# Patient Record
Sex: FEMALE | Race: WHITE | Employment: OTHER | URBAN - METROPOLITAN AREA
[De-identification: names, ages, dates, MRNs, and addresses within clinical notes are randomized per-mention and may not be internally consistent; named-entity substitution may affect disease eponyms.]

---

## 2017-01-25 ENCOUNTER — TELEPHONE (OUTPATIENT)
Dept: SURGERY | Age: 77
End: 2017-01-25

## 2017-01-25 NOTE — TELEPHONE ENCOUNTER
Spoke to pt, had left breast excisional biopsy 12/5/16, patient reports that she feels lump at surgery site, warm to touch, no sure if it is red, but does have occasional pain. Patient requesting an appointment. No available appts within next week, please advise.

## 2017-01-27 ENCOUNTER — OFFICE VISIT (OUTPATIENT)
Dept: SURGERY | Age: 77
End: 2017-01-27

## 2017-01-27 VITALS
HEIGHT: 61 IN | WEIGHT: 149.5 LBS | BODY MASS INDEX: 28.22 KG/M2 | RESPIRATION RATE: 20 BRPM | OXYGEN SATURATION: 96 % | SYSTOLIC BLOOD PRESSURE: 140 MMHG | HEART RATE: 62 BPM | DIASTOLIC BLOOD PRESSURE: 69 MMHG

## 2017-01-27 DIAGNOSIS — Z09 POSTOPERATIVE EXAMINATION: Primary | ICD-10-CM

## 2017-01-27 NOTE — MR AVS SNAPSHOT
Visit Information Date & Time Provider Department Dept. Phone Encounter #  
 1/27/2017  8:00 AM Lurdes Resendiz MD [facility] [id number] Your Appointments 2/3/2017 11:40 AM  
ESTABLISHED PATIENT with Lurdes Resendiz MD  
[facility] Dr. Skyler Kaur ([facility]) Appt Note: left Br [facility]peraation [address] [address]  
[address], [address] [facility], [address] [place] [address] [address] [address]  
  
    
 4/26/2017  1:20 PM  
ESTABLISHED PATIENT with Lurdes Resendiz MD  
[facility] Dr[facility] Skyler Kaur ([facility]) Appt Note: 1 yr breast check; ofc notes received given to [address], [address] [facility], [address] [facility]  
[phone] Upcoming Health Maintenance Date Due DTaP/Tdap/Td series (1 - Tdap) 5/24/1961 ZOSTER VACCINE AGE 60> 5/24/2000 GLAUCOMA SCREENING Q2Y 5/24/2005 OSTEOPOROSIS SCREENING (DEXA) 5/24/2005 Pneumococcal 65+ Low/Medium Risk (1 of 2 - PCV13) 5/24/2005 MEDICARE YEARLY EXAM 5/24/2005 INFLUENZA AGE 9 TO ADULT 8/1/2016 BREAST CANCER SCRN MAMMOGRAM 9/22/2017 Allergies as of 1/27/2017  Review Complete On: 1/27/2017 By: Lurdes Resendiz MD  
  
 Severity Noted Reaction Type Reactions Donnaphen [Phenobarb-hyoscy-atropine-scop] Medium 04/11/2011   Systemic Rash Arimidex [Anastrozole]  04/16/2012   Systemic Other (comments) Wandering pulmonary infiltrates Levaquin [Levofloxacin]  11/22/2016    Other (comments) \"set me wild\" Erythromycin Low 04/11/2011   Intolerance Other (comments) Abdominal pain Current Immunizations  Never Reviewed No immunizations on file. Not reviewed this visit You Were Diagnosed With   
  
 Codes Comments Postoperative examination    -  Primary ICD-10-CM: L16 ICD-9-CM: V67.00 Vitals BP Pulse Resp Height(growth percentile) Weight(growth percentile) SpO2  
 140/69 (BP 1 Location: Right arm, BP Patient Position: Sitting) 62 20 5' 1\" (1.549 m) 149 lb 8 oz (67.8 kg) 96% BMI OB Status Smoking Status 28.25 kg/m2 Hysterectomy Former Smoker BMI and BSA Data Body Mass Index Body Surface Area  
 28.25 kg/m 2 1.71 m 2 Preferred Pharmacy Pharmacy Name Phone [id number]- [place], [place] [phone] Your Updated Medication List  
  
   
This list is accurate as of: 1/27/17  8:57 AM.  Always use your most recent med list.  
  
  
  
  
 ARMOUR THYROID PO Take 15 mcg by mouth. atenolol 25 mg tablet Commonly known as:  TENORMIN Take 25 mg by mouth two (2) times a day. CALCIUM 600 PO Take  by mouth. CIPRO 500 mg tablet Generic drug:  ciprofloxacin HCl Take 500 mg by mouth two (2) times a day. clonazePAM 0.5 mg tablet Commonly known as:  [place] Take  by mouth nightly as needed. DIOVAN 80 mg tablet Generic drug:  valsartan Take 80 mg by mouth two (2) times a day. DULERA 200-5 mcg/actuation HFA inhaler Generic drug:  mometasone-formoterol Take 2 Puffs by inhalation two (2) times daily as needed. fluticasone 50 mcg/actuation nasal spray Commonly known as:  FLONASE  
nightly as needed. hydroCHLOROthiazide 25 mg tablet Commonly known as:  HYDRODIURIL Take 25 mg by mouth daily. PROAIR HFA 90 mcg/actuation inhaler Generic drug:  albuterol Take 2 Puffs by inhalation every four (4) hours as needed for Wheezing. PROTONIX 40 mg tablet Generic drug:  pantoprazole Take 40 mg by mouth daily. VITAMIN D3 1,000 unit tablet Generic drug:  cholecalciferol Take 4,000 Units by mouth daily. Introducing \[facility]\"" & HEALTH SERVICES!    
 Janette Rivera introduces [employer] patient portal. Now you can access parts of your medical record, email your doctor's office, and request medication refills online. 1. In your internet browser, go to [email or web address] 2. Click on the First Time User? Click Here link in the Sign In box. You will see the New Member Sign Up page. 3. Enter your ugichem Access Code exactly as it appears below. You will not need to use this code after youve completed the sign-up process. If you do not sign up before the expiration date, you must request a new code. · [employer]hem Access Code: [email or web address] Expires: 2/7/2017  4:16 PM 
 
4. Enter the last four digits of your Social Security Number (xxxx) and Date of Birth (mm/dd/yyyy) as indicated and click Submit. You will be taken to the next sign-up page. 5. Create a ugichem ID. This will be your ugichem login ID and cannot be changed, so think of one that is secure and easy to remember. 6. Create a ugichem password. You can change your password at any time. 7. Enter your Password Reset Question and Answer. This can be used at a later time if you forget your password. 8. Enter your e-mail address. You will receive e-mail notification when new information is available in [address] 9. Click Sign Up. You can now view and download portions of your medical record. 10. Click the Download Summary menu link to download a portable copy of your medical information. If you have questions, please visit the Frequently Asked Questions section of the ugichem website. Remember, [employer]ichem is NOT to be used for urgent needs. For medical emergencies, dial 911. Now available from your iPhone and Android! Please provide this summary of care documentation to your next provider. Your primary care clinician is listed as Dulcie Bloch. If you have any questions after today's visit, please call [phone].

## 2017-01-27 NOTE — PROGRESS NOTES
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 Negative for carcinoma.      S I feel ok more pain, no drainage but I have increasing swelling    Visit Vitals    /69 (BP 1 Location: Right arm, BP Patient Position: Sitting)    Pulse 62    Resp 20    Ht 5' 1\" (1.549 m)    Wt 67.8 kg (149 lb 8 oz)    SpO2 96%    BMI 28.25 kg/m2       O Incisions healing well without infection   Moderate seroma    A/P Doing well   FNA seroma 150 ml serosanguinous   RTC 1 week    Thea Wen MD FACS

## 2017-02-03 ENCOUNTER — OFFICE VISIT (OUTPATIENT)
Dept: SURGERY | Age: 77
End: 2017-02-03

## 2017-02-03 VITALS
DIASTOLIC BLOOD PRESSURE: 78 MMHG | HEART RATE: 65 BPM | TEMPERATURE: 97 F | BODY MASS INDEX: 28.04 KG/M2 | WEIGHT: 148.5 LBS | OXYGEN SATURATION: 98 % | SYSTOLIC BLOOD PRESSURE: 180 MMHG | HEIGHT: 61 IN | RESPIRATION RATE: 20 BRPM

## 2017-02-03 DIAGNOSIS — Z09 POSTOPERATIVE EXAMINATION: Primary | ICD-10-CM

## 2017-02-03 NOTE — MR AVS SNAPSHOT
Visit Information Date & Time Provider Department Dept. Phone Encounter #  
 2/3/2017 11:40 AM Keegan Hendrickson MD [facility] [place] [id number] [id number] Your Appointments 4/26/2017  1:20 PM  
ESTABLISHED PATIENT with Keegan Hendrickson MD  
[facility] Dr. Skyler Kaur ([facility]) Appt Note: 1 yr breast check; ofc notes received given to [address], [address], [address] [address]  
[address], [address], [address] [facility] [address] [address] Upcoming Health Maintenance Date Due DTaP/Tdap/Td series (1 - Tdap) 5/24/1961 ZOSTER VACCINE AGE 60> 5/24/2000 GLAUCOMA SCREENING Q2Y 5/24/2005 OSTEOPOROSIS SCREENING (DEXA) 5/24/2005 Pneumococcal 65+ Low/Medium Risk (1 of 2 - PCV13) 5/24/2005 MEDICARE YEARLY EXAM 5/24/2005 INFLUENZA AGE 9 TO ADULT 8/1/2016 BREAST CANCER SCRN MAMMOGRAM 9/22/2017 Allergies as of 2/3/2017  Review Complete On: 2/3/2017 By: Keegan Hendrickson MD  
  
 Severity Noted Reaction Type Reactions Donnaphen [Phenobarb-hyoscy-atropine-scop] Medium 04/11/2011   Systemic Rash Arimidex [Anastrozole]  04/16/2012   Systemic Other (comments) Wandering pulmonary infiltrates Levaquin [Levofloxacin]  11/22/2016    Other (comments) \"set me wild\" Erythromycin Low 04/11/2011   Intolerance Other (comments) Abdominal pain Current Immunizations  Never Reviewed No immunizations on file. Not reviewed this visit You Were Diagnosed With   
  
 Codes Comments Postoperative examination    -  Primary ICD-10-CM: F87 ICD-9-CM: V67.00 Vitals BP Pulse Temp Resp Height(growth percentile) Weight(growth percentile) 180/78 (BP 1 Location: Right arm, BP Patient Position: Sitting) 65 97 °F (36.1 °C) (Oral) 20 5' 1\" (1.549 m) 148 lb 8 oz (67.4 kg) SpO2 BMI OB Status Smoking Status 98% 28.06 kg/m2 Hysterectomy Former Smoker BMI and BSA Data Body Mass Index Body Surface Area 28.06 kg/m 2 1.7 m 2 Preferred Pharmacy Pharmacy Name Phone [id number] [place], [place] [phone] Your Updated Medication List  
  
   
This list is accurate as of: 2/3/17 11:54 AM.  Always use your most recent med list.  
  
  
  
  
 ARMOUR THYROID PO Take 15 mcg by mouth. atenolol 25 mg tablet Commonly known as:  TENORMIN Take 25 mg by mouth two (2) times a day. CALCIUM 600 PO Take  by mouth. CIPRO 500 mg tablet Generic drug:  ciprofloxacin HCl Take 500 mg by mouth two (2) times a day. clonazePAM 0.5 mg tablet Commonly known as:  [place] Take  by mouth nightly as needed. DIOVAN 80 mg tablet Generic drug:  valsartan Take 80 mg by mouth two (2) times a day. DULERA 200-5 mcg/actuation HFA inhaler Generic drug:  mometasone-formoterol Take 2 Puffs by inhalation two (2) times daily as needed. fluticasone 50 mcg/actuation nasal spray Commonly known as:  FLONASE  
nightly as needed. hydroCHLOROthiazide 25 mg tablet Commonly known as:  HYDRODIURIL Take 25 mg by mouth daily. PROAIR HFA 90 mcg/actuation inhaler Generic drug:  albuterol Take 2 Puffs by inhalation every four (4) hours as needed for Wheezing. PROTONIX 40 mg tablet Generic drug:  pantoprazole Take 40 mg by mouth daily. VITAMIN D3 1,000 unit tablet Generic drug:  cholecalciferol Take 4,000 Units by mouth daily. Introducing [facility] & HEALTH SERVICES! Michael Sarmiento introduces [employer] patient portal. Now you can access parts of your medical record, email your doctor's office, and request medication refills online. 1. In your internet browser, go to [email or web address] 2. Click on the First Time User? Click Here link in the Sign In box.  You will see the New Member Sign Up page. 3. Enter your [employer] Access Code exactly as it appears below. You will not need to use this code after youve completed the sign-up process. If you do not sign up before the expiration date, you must request a new code. · [employer] Access Code: [email or web address] Expires: 2/7/2017  4:16 PM 
 
4. Enter the last four digits of your Social Security Number (xxxx) and Date of Birth (mm/dd/yyyy) as indicated and click Submit. You will be taken to the next sign-up page. 5. Create a [employer] ID. This will be your [employer] login ID and cannot be changed, so think of one that is secure and easy to remember. 6. Create a [employer] password. You can change your password at any time. 7. Enter your Password Reset Question and Answer. This can be used at a later time if you forget your password. 8. Enter your e-mail address. You will receive e-mail notification when new information is available in [address] 9. Click Sign Up. You can now view and download portions of your medical record. 10. Click the Download Summary menu link to download a portable copy of your medical information. If you have questions, please visit the Frequently Asked Questions section of the [employer] website. Remember, [employer] is NOT to be used for urgent needs. For medical emergencies, dial 911. Now available from your iPhone and Android! Please provide this summary of care documentation to your next provider. Your primary care clinician is listed as Ray Goff. If you have any questions after today's visit, please call [phone].

## 2017-02-03 NOTE — PROGRESS NOTES
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 Negative for carcinoma.      S I feel ok more pain, no drainage but I have increasing swelling    Visit Vitals    /78 (BP 1 Location: Right arm, BP Patient Position: Sitting)    Pulse 65    Temp 97 °F (36.1 °C) (Oral)    Resp 20    Ht 5' 1\" (1.549 m)    Wt 67.4 kg (148 lb 8 oz)    SpO2 98%    BMI 28.06 kg/m2       O Incisions healing well without infection   Moderate seroma    A/P Doing well   FNA seroma 100 ml serosanguinous   RTC 1 week    Mark Odonnell MD FACS

## 2017-02-10 ENCOUNTER — OFFICE VISIT (OUTPATIENT)
Dept: SURGERY | Age: 77
End: 2017-02-10

## 2017-02-10 VITALS
HEIGHT: 61 IN | RESPIRATION RATE: 20 BRPM | WEIGHT: 148 LBS | OXYGEN SATURATION: 98 % | DIASTOLIC BLOOD PRESSURE: 67 MMHG | HEART RATE: 62 BPM | SYSTOLIC BLOOD PRESSURE: 153 MMHG | BODY MASS INDEX: 27.94 KG/M2

## 2017-02-10 DIAGNOSIS — Z09 POSTOPERATIVE EXAMINATION: Primary | ICD-10-CM

## 2017-02-10 NOTE — PROGRESS NOTES
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 Negative for carcinoma.      S I feel ok more pain, no drainage but I have increasing swelling    Visit Vitals    /67 (BP 1 Location: Right arm, BP Patient Position: Sitting)    Pulse 62    Resp 20    Ht 5' 1\" (1.549 m)    Wt 67.1 kg (148 lb)    SpO2 98%    BMI 27.96 kg/m2       O Incisions healing well without infection   Moderate seroma    A/P Doing well   FNA seroma 80 ml serosanguinous   RTC 1 week    Ivana Kc MD FACS

## 2017-02-10 NOTE — MR AVS SNAPSHOT
Visit Information Date & Time Provider Department Dept. Phone Encounter #  
 2/10/2017  9:40 AM Martina Castillo MD [facility] [place] [address] [id number] Your Appointments 2/17/2017 11:40 AM  
POST OP with Martina Castillo MD  
[facility] Dr[address] Skyler Kaur ([facility] Appt Note: aspiration of fluid from Left Breast  
 [address] [facility], [address], [address] [place] [address] [address]  
[address], [address], [address] [facility] [address] [address]  
  
    
 4/26/2017  1:20 PM  
ESTABLISHED PATIENT with Martina Castillo MD  
[facility] Dr. Skyler Kaur ([facility]) Appt Note: 1 yr breast check; ofc notes received given to   
 [address] [facility], [address] [facility], [address] [facility] [address] [address]  
[address], [address], [address] [facility] [address] [address] Upcoming Health Maintenance Date Due DTaP/Tdap/Td series (1 - Tdap) 5/24/1961 ZOSTER VACCINE AGE 60> 5/24/2000 GLAUCOMA SCREENING Q2Y 5/24/2005 OSTEOPOROSIS SCREENING (DEXA) 5/24/2005 Pneumococcal 65+ Low/Medium Risk (1 of 2 - PCV13) 5/24/2005 MEDICARE YEARLY EXAM 5/24/2005 INFLUENZA AGE 9 TO ADULT 8/1/2016 BREAST CANCER SCRN MAMMOGRAM 9/22/2017 Allergies as of 2/10/2017  Review Complete On: 2/10/2017 By: Martina Castillo MD  
  
 Severity Noted Reaction Type Reactions Donnaphen [Phenobarb-hyoscy-atropine-scop] Medium 04/11/2011   Systemic Rash Arimidex [Anastrozole]  04/16/2012   Systemic Other (comments) Wandering pulmonary infiltrates Levaquin [Levofloxacin]  11/22/2016    Other (comments) \"set me wild\" Erythromycin Low 04/11/2011   Intolerance Other (comments) Abdominal pain Current Immunizations  Never Reviewed No immunizations on file. Not reviewed this visit You Were Diagnosed With   
  
 Codes Comments Postoperative examination    -  Primary ICD-10-CM: O18 ICD-9-CM: V67.00 Vitals BP Pulse Resp Height(growth percentile) Weight(growth percentile) SpO2  
 153/67 (BP 1 Location: Right arm, BP Patient Position: Sitting) 62 20 5' 1\" (1.549 m) 148 lb (67.1 kg) 98% BMI OB Status Smoking Status 27.96 kg/m2 Hysterectomy Former Smoker BMI and BSA Data Body Mass Index Body Surface Area  
 27.96 kg/m 2 1.7 m 2 Preferred Pharmacy Pharmacy Name Phone [id number] [place], [place] [phone] Your Updated Medication List  
  
   
This list is accurate as of: 2/10/17 10:19 AM.  Always use your most recent med list.  
  
  
  
  
 ARMOUR THYROID PO Take 15 mcg by mouth. atenolol 25 mg tablet Commonly known as:  TENORMIN Take 25 mg by mouth two (2) times a day. CALCIUM 600 PO Take  by mouth. CIPRO 500 mg tablet Generic drug:  ciprofloxacin HCl Take 500 mg by mouth two (2) times a day. clonazePAM 0.5 mg tablet Commonly known as:  [place] Take  by mouth nightly as needed. DIOVAN 80 mg tablet Generic drug:  valsartan Take 80 mg by mouth two (2) times a day. DULERA 200-5 mcg/actuation HFA inhaler Generic drug:  mometasone-formoterol Take 2 Puffs by inhalation two (2) times daily as needed. fluticasone 50 mcg/actuation nasal spray Commonly known as:  FLONASE  
nightly as needed. hydroCHLOROthiazide 25 mg tablet Commonly known as:  HYDRODIURIL Take 25 mg by mouth daily. PROAIR HFA 90 mcg/actuation inhaler Generic drug:  albuterol Take 2 Puffs by inhalation every four (4) hours as needed for Wheezing. PROTONIX 40 mg tablet Generic drug:  pantoprazole Take 40 mg by mouth daily. VITAMIN D3 1,000 unit tablet Generic drug:  cholecalciferol Take 4,000 Units by mouth daily. Introducing [facility] & HEALTH SERVICES! [place] introduces [employer] patient portal. Now you can access parts of your medical record, email your doctor's office, and request medication refills online. 1. In your internet browser, go to [email or web address] 2. Click on the First Time User? Click Here link in the Sign In box. You will see the New Member Sign Up page. 3. Enter your [employer] Access Code exactly as it appears below. You will not need to use this code after youve completed the sign-up process. If you do not sign up before the expiration date, you must request a new code. · [employer] Access Code: [email or web address] Expires: 5/11/2017  9:55 AM 
 
4. Enter the last four digits of your Social Security Number (xxxx) and Date of Birth (mm/dd/yyyy) as indicated and click Submit. You will be taken to the next sign-up page. 5. Create a [employer] ID. This will be your [employer] login ID and cannot be changed, so think of one that is secure and easy to remember. 6. Create a [employer] password. You can change your password at any time. 7. Enter your Password Reset Question and Answer. This can be used at a later time if you forget your password. 8. Enter your e-mail address. You will receive e-mail notification when new information is available in [address] 9. Click Sign Up. You can now view and download portions of your medical record. 10. Click the Download Summary menu link to download a portable copy of your medical information. If you have questions, please visit the Frequently Asked Questions section of the [employer] website. Remember, [employer] is NOT to be used for urgent needs. For medical emergencies, dial 911. Now available from your iPhone and Android! Please provide this summary of care documentation to your next provider. Your primary care clinician is listed as Juan Alberto Thomas. If you have any questions after today's visit, please call [phone].

## 2017-02-17 ENCOUNTER — OFFICE VISIT (OUTPATIENT)
Dept: SURGERY | Age: 77
End: 2017-02-17

## 2017-02-17 VITALS
DIASTOLIC BLOOD PRESSURE: 64 MMHG | OXYGEN SATURATION: 98 % | HEART RATE: 70 BPM | BODY MASS INDEX: 28.13 KG/M2 | WEIGHT: 149 LBS | RESPIRATION RATE: 18 BRPM | SYSTOLIC BLOOD PRESSURE: 144 MMHG | HEIGHT: 61 IN

## 2017-02-17 DIAGNOSIS — Z09 POSTOPERATIVE EXAMINATION: Primary | ICD-10-CM

## 2017-02-17 NOTE — PROGRESS NOTES
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 Negative for carcinoma.      S I feel ok more pain, no drainage but I have increasing swelling    Visit Vitals    /64 (BP 1 Location: Right arm, BP Patient Position: Sitting)    Pulse 70    Resp 18    Ht 5' 1\" (1.549 m)    Wt 67.6 kg (149 lb)    SpO2 98%    BMI 28.15 kg/m2       O Incisions healing well without infection   Moderate seroma    A/P Doing well   FNA seroma 70 ml serosanguinous   RTC 1 week    Calixto Escobedo MD FACS

## 2017-02-17 NOTE — MR AVS SNAPSHOT
Visit Information Date & Time Provider Department Dept. Phone Encounter #  
 2/17/2017 11:40 AM Sathya Lake MD [facility] [id number] Your Appointments 2/24/2017  9:20 AM  
POST OP with Sathya Lake MD  
[facility] Dr. Skyler Kaur ([facility]) Appt Note: 1 wk fu  fluid drained [address] [place] [address] [address], [address] [address]  
[address], [address], [address] [facility] [address] [address]  
  
    
 4/26/2017  1:20 PM  
ESTABLISHED PATIENT with Sathya Lake MD  
[facility] Dr. Skyler Kaur ([facility]) Appt Note: 1 yr breast check; ofc notes received given to [address], [address] [facility], [address] [address]  
[address], [address], 280 [facility] [address] [address] Upcoming Health Maintenance Date Due DTaP/Tdap/Td series (1 - Tdap) 5/24/1961 ZOSTER VACCINE AGE 60> 5/24/2000 GLAUCOMA SCREENING Q2Y 5/24/2005 OSTEOPOROSIS SCREENING (DEXA) 5/24/2005 Pneumococcal 65+ Low/Medium Risk (1 of 2 - PCV13) 5/24/2005 MEDICARE YEARLY EXAM 5/24/2005 INFLUENZA AGE 9 TO ADULT 8/1/2016 BREAST CANCER SCRN MAMMOGRAM 9/22/2017 Allergies as of 2/17/2017  Review Complete On: 2/17/2017 By: Sathya Lake MD  
  
 Severity Noted Reaction Type Reactions Donnaphen [Phenobarb-hyoscy-atropine-scop] Medium 04/11/2011   Systemic Rash Arimidex [Anastrozole]  04/16/2012   Systemic Other (comments) Wandering pulmonary infiltrates Levaquin [Levofloxacin]  11/22/2016    Other (comments) \"set me wild\" Erythromycin Low 04/11/2011   Intolerance Other (comments) Abdominal pain Current Immunizations  Never Reviewed No immunizations on file. Not reviewed this visit You Were Diagnosed With   
  
 Codes Comments Postoperative examination    -  Primary ICD-10-CM: K85 ICD-9-CM: V67.00 Vitals BP Pulse Resp Height(growth percentile) Weight(growth percentile) SpO2  
 144/64 (BP 1 Location: Right arm, BP Patient Position: Sitting) 70 18 5' 1\" (1.549 m) 149 lb (67.6 kg) 98% BMI OB Status Smoking Status 28.15 kg/m2 Hysterectomy Former Smoker BMI and BSA Data Body Mass Index Body Surface Area  
 28.15 kg/m 2 1.71 m 2 Preferred Pharmacy Pharmacy Name Phone [id number] [phone] Your Updated Medication List  
  
   
This list is accurate as of: 2/17/17  1:14 PM.  Always use your most recent med list.  
  
  
  
  
 ARMOUR THYROID PO Take 15 mcg by mouth. atenolol 25 mg tablet Commonly known as:  TENORMIN Take 25 mg by mouth two (2) times a day. CALCIUM 600 PO Take  by mouth. CIPRO 500 mg tablet Generic drug:  ciprofloxacin HCl Take 500 mg by mouth two (2) times a day. clonazePAM 0.5 mg tablet Commonly known as:  Eliezer Aver Take  by mouth nightly as needed. DIOVAN 80 mg tablet Generic drug:  valsartan Take 80 mg by mouth two (2) times a day. DULERA 200-5 mcg/actuation HFA inhaler Generic drug:  mometasone-formoterol Take 2 Puffs by inhalation two (2) times daily as needed. fluticasone 50 mcg/actuation nasal spray Commonly known as:  FLONASE  
nightly as needed. hydroCHLOROthiazide 25 mg tablet Commonly known as:  HYDRODIURIL Take 25 mg by mouth daily. PROAIR HFA 90 mcg/actuation inhaler Generic drug:  albuterol Take 2 Puffs by inhalation every four (4) hours as needed for Wheezing. PROTONIX 40 mg tablet Generic drug:  pantoprazole Take 40 mg by mouth daily. VITAMIN D3 1,000 unit tablet Generic drug:  cholecalciferol Take 4,000 Units by mouth daily. Introducing [facility] & HEALTH SERVICES! [employer] introduces [employer] patient portal. Now you can access parts of your medical record, email your doctor's office, and request medication refills online. 1. In your internet browser, go to [email or web address] 2. Click on the First Time User? Click Here link in the Sign In box. You will see the New Member Sign Up page. 3. Enter your [employer] Access Code exactly as it appears below. You will not need to use this code after youve completed the sign-up process. If you do not sign up before the expiration date, you must request a new code. · [employer] Access Code: [email or web address] Expires: 5/11/2017  9:55 AM 
 
4. Enter the last four digits of your Social Security Number (xxxx) and Date of Birth (mm/dd/yyyy) as indicated and click Submit. You will be taken to the next sign-up page. 5. Create a [employer] ID. This will be your [employer] login ID and cannot be changed, so think of one that is secure and easy to remember. 6. Create a [employer] password. You can change your password at any time. 7. Enter your Password Reset Question and Answer. This can be used at a later time if you forget your password. 8. Enter your e-mail address. You will receive e-mail notification when new information is available in [address] 9. Click Sign Up. You can now view and download portions of your medical record. 10. Click the Download Summary menu link to download a portable copy of your medical information. If you have questions, please visit the Frequently Asked Questions section of the [employer] website. Remember, [employer] is NOT to be used for urgent needs. For medical emergencies, dial 911. Now available from your iPhone and Android! Please provide this summary of care documentation to your next provider. Your primary care clinician is listed as Lisa Bhat. If you have any questions after today's visit, please call [phone].

## 2017-02-28 ENCOUNTER — OFFICE VISIT (OUTPATIENT)
Dept: SURGERY | Age: 77
End: 2017-02-28

## 2017-02-28 VITALS
OXYGEN SATURATION: 94 % | HEIGHT: 61 IN | HEART RATE: 79 BPM | SYSTOLIC BLOOD PRESSURE: 140 MMHG | DIASTOLIC BLOOD PRESSURE: 47 MMHG | BODY MASS INDEX: 27.94 KG/M2 | WEIGHT: 148 LBS

## 2017-02-28 DIAGNOSIS — Z09 POSTOPERATIVE EXAMINATION: Primary | ICD-10-CM

## 2017-02-28 NOTE — MR AVS SNAPSHOT
Visit Information Date & Time Provider Department Dept. Phone Encounter #  
 2/28/2017 10:40 AM Zina La MD [facility] [id number] Your Appointments 3/20/2017 11:40 AM  
POST OP with Zina La MD  
[facility] Dr. Skyler Kaur ([facility]) Appt Note: 2 wk fu fluid drained [address] [facility] [address], [address] [address]  
[address], [address], 280 [facility] [address] [address]  
  
    
 4/26/2017  1:20 PM  
ESTABLISHED PATIENT with Zina La MD  
[facility] Dr. Skyler Kaur ([facility]) Appt Note: 1 yr breast check; ofc notes received given to [address], [address] [facility], [address] [address]  
[address], [address], 280 [facility] [address] [address] Upcoming Health Maintenance Date Due DTaP/Tdap/Td series (1 - Tdap) 5/24/1961 ZOSTER VACCINE AGE 60> 5/24/2000 GLAUCOMA SCREENING Q2Y 5/24/2005 OSTEOPOROSIS SCREENING (DEXA) 5/24/2005 Pneumococcal 65+ Low/Medium Risk (1 of 2 - PCV13) 5/24/2005 MEDICARE YEARLY EXAM 5/24/2005 INFLUENZA AGE 9 TO ADULT 8/1/2016 BREAST CANCER SCRN MAMMOGRAM 9/22/2017 Allergies as of 2/28/2017  Review Complete On: 2/28/2017 By: Zina La MD  
  
 Severity Noted Reaction Type Reactions Donnaphen [Phenobarb-hyoscy-atropine-scop] Medium 04/11/2011   Systemic Rash Arimidex [Anastrozole]  04/16/2012   Systemic Other (comments) Wandering pulmonary infiltrates Levaquin [Levofloxacin]  11/22/2016    Other (comments) \"set me wild\" Erythromycin Low 04/11/2011   Intolerance Other (comments) Abdominal pain Current Immunizations  Never Reviewed No immunizations on file. Not reviewed this visit You Were Diagnosed With   
  
 Codes Comments Postoperative examination    -  Primary ICD-10-CM: J81 ICD-9-CM: V67.00 Vitals BP  
  
  
  
  
  
 140/47 Vitals History BMI and BSA Data Body Mass Index Body Surface Area  
 27.96 kg/m 2 1.7 m 2 Preferred Pharmacy Pharmacy Name Phone [id number] [place] [place] [phone] Your Updated Medication List  
  
   
This list is accurate as of: 2/28/17 11:30 AM.  Always use your most recent med list.  
  
  
  
  
 ARMOUR THYROID PO Take 15 mcg by mouth. atenolol 25 mg tablet Commonly known as:  TENORMIN Take 25 mg by mouth two (2) times a day. CALCIUM 600 PO Take  by mouth. CIPRO 500 mg tablet Generic drug:  ciprofloxacin HCl Take 500 mg by mouth two (2) times a day. clonazePAM 0.5 mg tablet Commonly known as:  Marcela Broaden Take  by mouth nightly as needed. DIOVAN 80 mg tablet Generic drug:  valsartan Take 80 mg by mouth two (2) times a day. DULERA 200-5 mcg/actuation HFA inhaler Generic drug:  mometasone-formoterol Take 2 Puffs by inhalation two (2) times daily as needed. fluticasone 50 mcg/actuation nasal spray Commonly known as:  FLONASE  
nightly as needed. hydroCHLOROthiazide 25 mg tablet Commonly known as:  HYDRODIURIL Take 25 mg by mouth daily. PROAIR HFA 90 mcg/actuation inhaler Generic drug:  albuterol Take 2 Puffs by inhalation every four (4) hours as needed for Wheezing. PROTONIX 40 mg tablet Generic drug:  pantoprazole Take 40 mg by mouth daily. VITAMIN D3 1,000 unit tablet Generic drug:  cholecalciferol Take 4,000 Units by mouth daily. Introducing [facility] & HEALTH SERVICES! Javier Neely introduces [employer] patient portal. Now you can access parts of your medical record, email your doctor's office, and request medication refills online.    
 
1. In your internet browser, go to [email or web address] 2. Click on the First Time User? Click Here link in the Sign In box. You will see the New Member Sign Up page. 3. Enter your [employer] Access Code exactly as it appears below. You will not need to use this code after youve completed the sign-up process. If you do not sign up before the expiration date, you must request a new code. · [employer] Access Code: [email or web address] Expires: 5/11/2017  9:55 AM 
 
4. Enter the last four digits of your Social Security Number (xxxx) and Date of Birth (mm/dd/yyyy) as indicated and click Submit. You will be taken to the next sign-up page. 5. Create a YaSabet ID. This will be your [employer] login ID and cannot be changed, so think of one that is secure and easy to remember. 6. Create a [employer] password. You can change your password at any time. 7. Enter your Password Reset Question and Answer. This can be used at a later time if you forget your password. 8. Enter your e-mail address. You will receive e-mail notification when new information is available in [address] 9. Click Sign Up. You can now view and download portions of your medical record. 10. Click the Download Summary menu link to download a portable copy of your medical information. If you have questions, please visit the Frequently Asked Questions section of the [employer] website. Remember, [employer] is NOT to be used for urgent needs. For medical emergencies, dial 911. Now available from your iPhone and Android! Please provide this summary of care documentation to your next provider. Your primary care clinician is listed as Jack Buchanan. If you have any questions after today's visit, please call [phone].

## 2017-02-28 NOTE — PROGRESS NOTES
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 Negative for carcinoma.      S I feel ok more pain, no drainage but I have increasing swelling    Visit Vitals    /47    Pulse 79    Ht 5' 1\" (1.549 m)    Wt 67.1 kg (148 lb)    SpO2 94%    BMI 27.96 kg/m2       O Incisions healing well without infection   Moderate seroma    A/P Doing well   FNA seroma 60 ml serosanguinous   RTC 2 weeks    Betsy Garcia MD FACS

## 2017-03-20 ENCOUNTER — OFFICE VISIT (OUTPATIENT)
Dept: SURGERY | Age: 77
End: 2017-03-20

## 2017-03-20 VITALS
SYSTOLIC BLOOD PRESSURE: 157 MMHG | OXYGEN SATURATION: 96 % | RESPIRATION RATE: 22 BRPM | DIASTOLIC BLOOD PRESSURE: 66 MMHG | HEART RATE: 60 BPM | WEIGHT: 150 LBS | BODY MASS INDEX: 28.32 KG/M2 | HEIGHT: 61 IN

## 2017-03-20 DIAGNOSIS — N64.89 SEROMA OF BREAST: Primary | ICD-10-CM

## 2017-03-20 NOTE — PROGRESS NOTES
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 Negative for carcinoma.      S I feel ok more pain, no drainage but I have increasing swelling    Visit Vitals    /66 (BP 1 Location: Right arm, BP Patient Position: Sitting)    Pulse 60    Resp 22    Ht 5' 1\" (1.549 m)    Wt 68 kg (150 lb)    SpO2 96%    BMI 28.34 kg/m2       O Incisions healing well without infection   Moderate seroma    A/P Doing well   FNA seroma 40 ml serosanguinous   RTC 4 weeks    Sin Selby MD FACS

## 2017-05-02 ENCOUNTER — OFFICE VISIT (OUTPATIENT)
Dept: SURGERY | Age: 77
End: 2017-05-02

## 2017-05-02 VITALS
WEIGHT: 148 LBS | HEIGHT: 61 IN | SYSTOLIC BLOOD PRESSURE: 169 MMHG | OXYGEN SATURATION: 95 % | HEART RATE: 66 BPM | DIASTOLIC BLOOD PRESSURE: 75 MMHG | BODY MASS INDEX: 27.94 KG/M2

## 2017-05-02 DIAGNOSIS — Z80.3 FAMILY HX-BREAST MALIGNANCY: ICD-10-CM

## 2017-05-02 DIAGNOSIS — Z85.3 PERSONAL HISTORY OF MALIGNANT NEOPLASM OF BREAST: Primary | ICD-10-CM

## 2017-05-02 NOTE — MR AVS SNAPSHOT
Visit Information Date & Time Provider Department Dept. Phone Encounter #  
 5/2/2017  8:00 AM Uzair Knutson MD [facility] [id number] Your Appointments 10/31/2017  8:00 AM  
ESTABLISHED PATIENT with Uzair Knutson MD  
[facility] [address] [facility] ([address]) Appt Note: 6 month follow up  
 [facility], [address] [facility], [address] [address]  
[address], [address], 280 [facility] [address] [address] Upcoming Health Maintenance Date Due DTaP/Tdap/Td series (1 - Tdap) 5/24/1961 ZOSTER VACCINE AGE 60> 5/24/2000 GLAUCOMA SCREENING Q2Y 5/24/2005 OSTEOPOROSIS SCREENING (DEXA) 5/24/2005 Pneumococcal 65+ Low/Medium Risk (1 of 2 - PCV13) 5/24/2005 MEDICARE YEARLY EXAM 5/24/2005 INFLUENZA AGE 9 TO ADULT 8/1/2017 BREAST CANCER SCRN MAMMOGRAM 11/1/2017 Allergies as of 5/2/2017  Review Complete On: 5/2/2017 By: Uzair Knutson MD  
  
 Severity Noted Reaction Type Reactions Donnaphen [Phenobarb-hyoscy-atropine-scop] Medium 04/11/2011   Systemic Rash Arimidex [Anastrozole]  04/16/2012   Systemic Other (comments) Wandering pulmonary infiltrates Levaquin [Levofloxacin]  11/22/2016    Other (comments) \"set me wild\" Erythromycin Low 04/11/2011   Intolerance Other (comments) Abdominal pain Current Immunizations  Never Reviewed No immunizations on file. Not reviewed this visit You Were Diagnosed With   
  
 Codes Comments Personal history of malignant neoplasm of breast    -  Primary ICD-10-CM: Z85.3 ICD-9-CM: V10.3 Family hx-breast malignancy     ICD-10-CM: Z80.3 ICD-9-CM: V16.3 Vitals BP Pulse Height(growth percentile) Weight(growth percentile) SpO2 BMI  
 169/75 (BP 1 Location: Left arm, BP Patient Position: Sitting) 66 5' 1\" (1.549 m) 148 lb (67.1 kg) 95% 27.96 kg/m2 OB Status Smoking Status Hysterectomy Former Smoker BMI and BSA Data Body Mass Index Body Surface Area  
 27.96 kg/m 2 1.7 m 2 Preferred Pharmacy Pharmacy Name Phone [id number] [phone] Your Updated Medication List  
  
   
This list is accurate as of: 5/2/17  4:42 PM.  Always use your most recent med list.  
  
  
  
  
 ARMOUR THYROID PO Take 15 mcg by mouth. atenolol 25 mg tablet Commonly known as:  TENORMIN Take 25 mg by mouth two (2) times a day. CALCIUM 600 PO Take  by mouth. CIPRO 500 mg tablet Generic drug:  ciprofloxacin HCl Take 500 mg by mouth two (2) times a day. clonazePAM 0.5 mg tablet Commonly known as:  Lorin Crawford Take  by mouth nightly as needed. DIOVAN 80 mg tablet Generic drug:  valsartan Take 80 mg by mouth two (2) times a day. DULERA 200-5 mcg/actuation HFA inhaler Generic drug:  mometasone-formoterol Take 2 Puffs by inhalation two (2) times daily as needed. fluticasone 50 mcg/actuation nasal spray Commonly known as:  FLONASE  
nightly as needed. hydroCHLOROthiazide 25 mg tablet Commonly known as:  HYDRODIURIL Take 25 mg by mouth daily. PROAIR HFA 90 mcg/actuation inhaler Generic drug:  albuterol Take 2 Puffs by inhalation every four (4) hours as needed for Wheezing. PROTONIX 40 mg tablet Generic drug:  pantoprazole Take 40 mg by mouth daily. VITAMIN D3 1,000 unit tablet Generic drug:  cholecalciferol Take 4,000 Units by mouth daily. To-Do List   
 09/25/2017 Imaging:  MIRELLA 3D FERN W MAMMO BI DX INCL CAD Introducing [facility] & HEALTH SERVICES! Velma Cruz introduces [employer] patient portal. Now you can access parts of your medical record, email your doctor's office, and request medication refills online.    
 
1. In your internet browser, go to [email or web address] 2. Click on the First Time User? Click Here link in the Sign In box. You will see the New Member Sign Up page. 3. Enter your [employer] Access Code exactly as it appears below. You will not need to use this code after youve completed the sign-up process. If you do not sign up before the expiration date, you must request a new code. · [employer] Access Code: [email or web address] Expires: 5/11/2017 10:55 AM 
 
4. Enter the last four digits of your Social Security Number (xxxx) and Date of Birth (mm/dd/yyyy) as indicated and click Submit. You will be taken to the next sign-up page. 5. Create a [employer] ID. This will be your [employer] login ID and cannot be changed, so think of one that is secure and easy to remember. 6. Create a [employer] password. You can change your password at any time. 7. Enter your Password Reset Question and Answer. This can be used at a later time if you forget your password. 8. Enter your e-mail address. You will receive e-mail notification when new information is available in [address] 9. Click Sign Up. You can now view and download portions of your medical record. 10. Click the Download Summary menu link to download a portable copy of your medical information. If you have questions, please visit the Frequently Asked Questions section of the [employer] website. Remember, [employer] is NOT to be used for urgent needs. For medical emergencies, dial 911. Now available from your iPhone and Android! Please provide this summary of care documentation to your next provider. Your primary care clinician is listed as Teodora Gallardo. If you have any questions after today's visit, please call [phone].

## 2017-09-25 ENCOUNTER — HOSPITAL ENCOUNTER (OUTPATIENT)
Dept: MAMMOGRAPHY | Age: 77
Discharge: HOME | End: 2017-09-25
Attending: SURGERY
Payer: MEDICARE

## 2017-09-25 DIAGNOSIS — Z80.3 FAMILY HX-BREAST MALIGNANCY: ICD-10-CM

## 2017-09-25 DIAGNOSIS — Z85.3 PERSONAL HISTORY OF MALIGNANT NEOPLASM OF BREAST: ICD-10-CM

## 2017-09-25 PROCEDURE — 77062 BREAST TOMOSYNTHESIS BI: CPT

## 2017-10-31 ENCOUNTER — OFFICE VISIT (OUTPATIENT)
Dept: SURGERY | Age: 77
End: 2017-10-31

## 2017-10-31 VITALS
HEART RATE: 57 BPM | SYSTOLIC BLOOD PRESSURE: 153 MMHG | TEMPERATURE: 96.4 F | OXYGEN SATURATION: 100 % | BODY MASS INDEX: 26.3 KG/M2 | DIASTOLIC BLOOD PRESSURE: 63 MMHG | RESPIRATION RATE: 16 BRPM | WEIGHT: 139.31 LBS | HEIGHT: 61 IN

## 2017-10-31 DIAGNOSIS — Z85.3 PERSONAL HISTORY OF MALIGNANT NEOPLASM OF BREAST: ICD-10-CM

## 2017-10-31 DIAGNOSIS — R93.89 ABNORMAL CT SCAN, CHEST: Primary | ICD-10-CM

## 2017-10-31 RX ORDER — LEVOTHYROXINE SODIUM 25 UG/1
TABLET ORAL
Refills: 5 | COMMUNITY
Start: 2017-10-22

## 2017-10-31 NOTE — MR AVS SNAPSHOT
Visit Information Date & Time Provider Department Dept. Phone Encounter #  
 10/31/2017  8:00 AM Dariel Chaves MD [facility] [place] [address] [id number] Upcoming Health Maintenance Date Due DTaP/Tdap/Td series (1 - Tdap) 5/24/1961 ZOSTER VACCINE AGE 60> 3/24/2000 GLAUCOMA SCREENING Q2Y 5/24/2005 Pneumococcal 65+ Low/Medium Risk (1 of 2 - PCV13) 5/24/2005 MEDICARE YEARLY EXAM 5/24/2005 INFLUENZA AGE 9 TO ADULT 8/1/2017 BREAST CANCER SCRN MAMMOGRAM 9/25/2018 Allergies as of 10/31/2017  Review Complete On: 10/31/2017 By: Dariel Chaves MD  
  
 Severity Noted Reaction Type Reactions Donnaphen [Phenobarb-hyoscy-atropine-scop] Medium 04/11/2011   Systemic Rash Arimidex [Anastrozole]  04/16/2012   Systemic Other (comments) Wandering pulmonary infiltrates Levaquin [Levofloxacin]  11/22/2016    Other (comments) \"set me wild\" Erythromycin Low 04/11/2011   Intolerance Other (comments) Abdominal pain Current Immunizations  Never Reviewed No immunizations on file. Not reviewed this visit You Were Diagnosed With   
  
 Codes Comments Abnormal CT scan, chest    -  Primary ICD-10-CM: R93.8 ICD-9-CM: 793.2 Personal history of malignant neoplasm of breast     ICD-10-CM: Z85.3 ICD-9-CM: V10.3 Vitals BP Pulse Temp Resp Height(growth percentile) Weight(growth percentile) 153/63 (BP 1 Location: Right arm, BP Patient Position: Sitting) (!) 57 96.4 °F (35.8 °C) (Oral) 16 5' 1\" (1.549 m) 139 lb 5 oz (63.2 kg) SpO2 BMI OB Status Smoking Status 100% 26.32 kg/m2 Hysterectomy Former Smoker BMI and BSA Data Body Mass Index Body Surface Area  
 26.32 kg/m 2 1.65 m 2 Preferred Pharmacy Pharmacy Name Phone [id number] [place], [place] [phone] Your Updated Medication List  
  
   
 This list is accurate as of: 10/31/17 10:05 AM.  Always use your most recent med list.  
  
  
  
  
 ARMOUR THYROID PO Take 15 mcg by mouth. atenolol 25 mg tablet Commonly known as:  TENORMIN Take 25 mg by mouth two (2) times a day. CALCIUM 600 PO Take  by mouth. CIPRO 500 mg tablet Generic drug:  ciprofloxacin HCl Take 500 mg by mouth two (2) times a day. clonazePAM 0.5 mg tablet Commonly known as:  Nahed Clines Take  by mouth nightly as needed. DIOVAN 80 mg tablet Generic drug:  valsartan Take 80 mg by mouth two (2) times a day. DULERA 200-5 mcg/actuation HFA inhaler Generic drug:  mometasone-formoterol Take 2 Puffs by inhalation two (2) times daily as needed. fluticasone 50 mcg/actuation nasal spray Commonly known as:  FLONASE  
nightly as needed. hydroCHLOROthiazide 25 mg tablet Commonly known as:  HYDRODIURIL Take 25 mg by mouth daily. PROAIR HFA 90 mcg/actuation inhaler Generic drug:  albuterol Take 2 Puffs by inhalation every four (4) hours as needed for Wheezing. PROTONIX 40 mg tablet Generic drug:  pantoprazole Take 40 mg by mouth daily. synthroid 25 mcg tablet Generic drug:  levothyroxine TAKE 1 TABLET ON AN EMPTY STOMACH IN THE MORNING  
  
 VITAMIN D3 1,000 unit tablet Generic drug:  cholecalciferol Take 4,000 Units by mouth daily. To-Do List   
 12/01/2017 Imaging:  CT CHEST W WO CONT Introducing [facility]! Graham Connor introduces [facility] patient portal. Now you can access parts of your medical record, email your doctor's office, and request medication refills online. 1. In your internet browser, go to [email or web address] 2. Click on the First Time User? Click Here link in the Sign In box. You will see the New Member Sign Up page. 3. Enter your [facility] Access Code exactly as it appears below.  You will not need to use this code after youve completed the sign-up process. If you do not sign up before the expiration date, you must request a new code. · Sw[employer] Access Code: [email or web address] Expires: 12/24/2017  8:51 AM 
 
4. Enter the last four digits of your Social Security Number (xxxx) and Date of Birth (mm/dd/yyyy) as indicated and click Submit. You will be taken to the next sign-up page. 5. Create a Swopboard ID. This will be your [employer] login ID and cannot be changed, so think of one that is secure and easy to remember. 6. Create a Sw[employer] password. You can change your password at any time. 7. Enter your Password Reset Question and Answer. This can be used at a later time if you forget your password. 8. Enter your e-mail address. You will receive e-mail notification when new information is available in [address] 9. Click Sign Up. You can now view and download portions of your medical record. 10. Click the Download Summary menu link to download a portable copy of your medical information. If you have questions, please visit the Frequently Asked Questions section of the [employer] website. Remember, [employer] is NOT to be used for urgent needs. For medical emergencies, dial 911. Now available from your iPhone and Android! Please provide this summary of care documentation to your next provider. Your primary care clinician is listed as Carlos Robles. If you have any questions after today's visit, please call [phone].

## 2017-10-31 NOTE — PROGRESS NOTES
HISTORY OF PRESENT ILLNESS  Maria A Carlson is a 68 y. o.white female who comes in for breast cancer follow up  Breast Cancer   Associated symptoms include headaches and shortness of breath. Pertinent negatives include no chest pain and no abdominal pain. Associated symptoms include headaches and shortness of breath. Pertinent negatives include no chest pain and no abdominal pain. She was diagnosed with a stage 1 ([id number]) left breast cancer on Coty 10, 2005. She had infiltrating lobular carcinoma which was ER pos 65%, [place] negative and her2/thais negative. She was treated with Left lumpectomy and [place] lymph node biopsy, followed by whole breast radiation on B39/0413 clinical trial (Dr Ramonita Smith) and Arimidex (Dr Reji Lozoya). She developed pulmonary infiltrates ultimately requiring lung bx and this was determined to be due to the Arimidex. She was switched over to tamoxifen which she took for five years. She has ongoing breast pain but denies any new changes in her breasts, nipple changes or drainage, unexplained weight loss or bone pain. Breast MRI ([facility]) 10/5/2015 was negative. She had a bilateral mammogram 9/25/2017 which was ok. She had menarche at 5, first of two pregnancies at 29, menopause in her late 45s and did not take HRT. Her sister was dx'd with breast cancer at 72 and her BRCA testing which was negative. She had a breast MRI at the [facility] 10/2016 and was noted to have a 5 mm enhancing suspicious lesion near the lumpectomy bed. MRI bx came back as fibrosis but Dr Owens felt the area was still worrisome and may have been discordant. She strongly recommended excision of the area. She underwent reexcision of the lumpectomy bed 11/2016 which was negative.        Past Medical History:   Diagnosis Date    Anxiety     Arthritis     Breast CA ([facility] [address] 2005    invasive breast lobular ca (radiation, arimidex, then switched tamoxifen)    Breast cancer ([facility] [address] 10/2016    left breast lobular ca, [id number], lumpectomy, ALND, radiation, arimidex but switched to tamoxifen    Diarrhea     Dyspepsia and other specified disorders of function of stomach     GERD (gastroesophageal reflux disease)     Headache(784.0)     Hypertension     Migraine     Osteoporosis     Osteoporosis     Osteoporosis     Premature ventricular contractions (PVCs) (VPCs)     Thyroid disease     Visual loss      Past Surgical History:   Procedure Laterality Date    CHEST SURGERY PROCEDURE UNLISTED  2005    lung bx    HX BREAST BIOPSY Left 12/5/2016    LEFT BREAST NEEDLE LOCALIZATION, EXCISIONAL BIOPSY (CHOICE ANESTH) performed by Calixto Escobedo MD at [facility] AMBULATORY OR    HX BREAST LUMPECTOMY Left 05/2005    and ALND    HX BUNIONECTOMY Bilateral 1972    no hardware    HX CATARACT REMOVAL Bilateral 06/2012    HX GYN      3 D&Cs    HX HYSTERECTOMY  2008    HX LAURA AND BSO       Family History   Problem Relation Age of Onset    Stroke Mother     Heart Disease Mother     Hypertension Mother     Stroke Father     Heart Disease Father     Hypertension Father     Diabetes Brother     Heart Disease Brother     Hypertension Brother     Cancer Sister 76     breast ca    Breast Cancer Sister 76    Cancer Sister 58     endometriosis     Social History   Substance Use Topics    Smoking status: Former Smoker     Quit date: 4/11/1978    Smokeless tobacco: Never Used    Alcohol use Yes      Comment: rarely     Current Outpatient Prescriptions   Medication Sig    SYNTHROID 25 mcg tablet TAKE 1 TABLET ON AN EMPTY STOMACH IN THE MORNING    mometasone-formoterol (DULERA) 200-5 mcg/actuation HFA inhaler Take 2 Puffs by inhalation two (2) times daily as needed.  albuterol (PROAIR HFA) 90 mcg/actuation inhaler Take 2 Puffs by inhalation every four (4) hours as needed for Wheezing.  clonazePAM (KLONOPIN) 0.5 mg tablet Take  by mouth nightly as needed.     fluticasone (FLONASE) 50 mcg/actuation nasal spray nightly as needed.  valsartan (DIOVAN) 80 mg tablet Take 80 mg by mouth two (2) times a day.  atenolol (TENORMIN) 25 mg tablet Take 25 mg by mouth two (2) times a day.  hydrochlorothiazide (HYDRODIURIL) 25 mg tablet Take 25 mg by mouth daily.  pantoprazole (PROTONIX) 40 mg tablet Take 40 mg by mouth daily.  CALCIUM CARBONATE (CALCIUM 600 PO) Take  by mouth.  cholecalciferol, vitamin d3, (VITAMIN D) 1,000 unit tablet Take 4,000 Units by mouth daily.  ciprofloxacin HCl (CIPRO) 500 mg tablet Take 500 mg by mouth two (2) times a day.  THYROID,PORK (ARMOUR THYROID PO) Take 15 mcg by mouth. No current facility-administered medications for this visit. Allergies   Allergen Reactions    Donnaphen [Phenobarb-Hyoscy-Atropine-Scop] Rash    Arimidex [Anastrozole] Other (comments)     Wandering pulmonary infiltrates    Levaquin [Levofloxacin] Other (comments)     \"set me wild\"      Erythromycin Other (comments)     Abdominal pain       Review of Systems   Constitutional: Negative for chills, diaphoresis, fever and weight loss. HENT: Positive for congestion. Negative for sore throat. Eyes: Positive for blurred vision. Negative for discharge. Respiratory: Positive for shortness of breath. Negative for cough ( occ) and wheezing. Cardiovascular: Negative for chest pain, palpitations, orthopnea, claudication and leg swelling. Gastrointestinal: Positive for diarrhea and heartburn. Negative for abdominal pain, constipation, melena, nausea and vomiting. Bloating and indigestion  Recent colonoscopy suggesting microcytic colitis   Genitourinary: Negative for dysuria, flank pain, frequency and hematuria. Musculoskeletal: Positive for joint pain. Negative for back pain, myalgias and neck pain. Skin: Negative for rash. Neurological: Positive for headaches. Negative for dizziness, speech change, focal weakness, seizures, loss of consciousness and weakness.    Endo/Heme/Allergies: Bruises/bleeds easily. Psychiatric/Behavioral: Negative for depression and memory loss. Visit Vitals    /63 (BP 1 Location: Right arm, BP Patient Position: Sitting)  Comment: LG    Pulse (!) 57    Temp 96.4 °F (35.8 °C) (Oral)    Resp 16    Ht 5' 1\" (1.549 m)    Wt 63.2 kg (139 lb 5 oz)    SpO2 100%    BMI 26.32 kg/m2       Physical Exam   Constitutional: She is oriented to person, place, and time. She appears well-developed and well-nourished. No distress. HENT:   Head: Normocephalic and atraumatic. Mouth/Throat: Oropharynx is clear and moist. No oropharyngeal exudate. Eyes: Conjunctivae and EOM are normal. Pupils are equal, round, and reactive to light. No scleral icterus. Neck: Normal range of motion. Neck supple. No JVD present. No tracheal deviation present. No thyromegaly present. Cardiovascular: Normal rate and regular rhythm. Exam reveals no gallop and no friction rub. No murmur heard. Pulmonary/Chest: Effort normal. No respiratory distress. She has no wheezes. She has no rales. Right breast exhibits no inverted nipple, no mass, no nipple discharge, no skin change and no tenderness. Left breast exhibits mass (5 cm seroma in cavity) and tenderness (lumpectomy site). Left breast exhibits no inverted nipple, no nipple discharge and no skin change. Breasts are asymmetrical (slight disfigurement in the lower inner aspect of the left breast). Abdominal: Soft. Bowel sounds are normal. She exhibits no distension and no mass. There is no tenderness. There is no rebound and no guarding. Musculoskeletal: Normal range of motion. She exhibits no edema. Lymphadenopathy:     She has no cervical adenopathy. She has no axillary adenopathy. Right: No supraclavicular adenopathy present. Left: No supraclavicular adenopathy present. Neurological: She is alert and oriented to person, place, and time. No cranial nerve deficit. Skin: Skin is warm and dry.  No rash noted. She is not diaphoretic. No erythema. No pallor. Psychiatric: Her behavior is normal. Judgment and thought content normal.       ASSESSMENT and PLAN  1. Hx stage I breast ca: currently CHAR at 12 years 4 months:  CHAR  Bilateral 3D screening mammogram after 9/25/2018. She desires change to [facility] for mammogram  We had a lengthy discussion regarding breast cancer and MRIs. She does not want to pursue them unless absolutely necessary. We discussed her risk profile with her sister with postmenopausal breast cancer and her personal hx of post menopausal breast cancer. She is in a moderate risk category (BRCA negative)    Keep f/u with Eric Mae   2. Abnormal CT of chest.  Due for repeat CT 12/1/2017  3. Social hx.    going into hospice today  RTC 6 months      Lakesha Martinez MD FACS

## 2018-01-20 ENCOUNTER — HOSPITAL ENCOUNTER (OUTPATIENT)
Dept: CT IMAGING | Age: 78
Discharge: HOME | End: 2018-01-20
Attending: SURGERY
Payer: MEDICARE

## 2018-01-20 DIAGNOSIS — Z85.3 PERSONAL HISTORY OF MALIGNANT NEOPLASM OF BREAST: ICD-10-CM

## 2018-01-20 DIAGNOSIS — R93.89 ABNORMAL CT SCAN, CHEST: ICD-10-CM

## 2018-01-20 PROCEDURE — 82565 ASSAY OF CREATININE: CPT

## 2018-01-20 PROCEDURE — 74011636320 HC RX REV CODE- 636/320: Performed by: SURGERY

## 2018-01-20 PROCEDURE — 71260 CT THORAX DX C+: CPT

## 2018-01-20 PROCEDURE — 74011250636 HC RX REV CODE- 250/636: Performed by: SURGERY

## 2018-01-20 RX ORDER — SODIUM CHLORIDE 9 MG/ML
50 INJECTION, SOLUTION INTRAVENOUS
Status: COMPLETED | OUTPATIENT
Start: 2018-01-20 | End: 2018-01-20

## 2018-01-20 RX ORDER — SODIUM CHLORIDE 0.9 % (FLUSH) 0.9 %
10 SYRINGE (ML) INJECTION
Status: COMPLETED | OUTPATIENT
Start: 2018-01-20 | End: 2018-01-20

## 2018-01-20 RX ADMIN — Medication 10 ML: at 13:24

## 2018-01-20 RX ADMIN — IOPAMIDOL 100 ML: 755 INJECTION, SOLUTION INTRAVENOUS at 13:24

## 2018-01-20 RX ADMIN — SODIUM CHLORIDE 50 ML/HR: 900 INJECTION, SOLUTION INTRAVENOUS at 13:24

## 2018-01-22 ENCOUNTER — TELEPHONE (OUTPATIENT)
Dept: SURGERY | Age: 78
End: 2018-01-22

## 2018-01-22 DIAGNOSIS — M89.9 BONE LESION: ICD-10-CM

## 2018-01-22 DIAGNOSIS — R91.1 LUNG NODULE: Primary | ICD-10-CM

## 2018-01-22 LAB — CREAT BLD-MCNC: 0.8 MG/DL (ref 0.6–1.3)

## 2018-01-23 ENCOUNTER — TELEPHONE (OUTPATIENT)
Dept: SURGERY | Age: 78
End: 2018-01-23

## 2018-01-23 NOTE — TELEPHONE ENCOUNTER
CT chest shows new sclerotic rib lesion and lumbar fracture. She did have a fall three weeks ago.   Needs bone scan to assess bone sclerotic rib lesion  She also has a lumbar fracture which is new since 6/2017 CT of the chest  Repeat CT chest with and without contrast in 6 months    She will contact her spine doctor about the back fracture    I will order a repeat CT of the chest for 6 months and a bone scan  She will f/u with Dr Sierra Cyr MD FACS

## 2018-01-23 NOTE — TELEPHONE ENCOUNTER
Patient would like her CT report faxed to her PCP. Dr. Gabriel Hernández fax #: [phone].  Also, please fax to Dr. Salome Ozuna.
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
## 2018-01-29 ENCOUNTER — HOSPITAL ENCOUNTER (OUTPATIENT)
Dept: NUCLEAR MEDICINE | Age: 78
Discharge: HOME | End: 2018-01-29
Attending: SURGERY
Payer: MEDICARE

## 2018-01-29 DIAGNOSIS — M89.9 BONE LESION: ICD-10-CM

## 2018-01-29 PROCEDURE — 78306 BONE IMAGING WHOLE BODY: CPT

## 2018-01-31 NOTE — TELEPHONE ENCOUNTER
Bone scan looks more like healing rib fractures and lumbar fracture    Pt requests sending copy of bone scan report to her by mail. I will ask my nurse, Bernice Lot, to do so.       Carla Wolf MD FACS

## 2018-07-10 ENCOUNTER — HOSPITAL ENCOUNTER (OUTPATIENT)
Dept: CT IMAGING | Age: 78
Discharge: HOME | End: 2018-07-10
Attending: INTERNAL MEDICINE
Payer: MEDICARE

## 2018-07-10 DIAGNOSIS — R91.8 MULTIPLE PULMONARY NODULES: ICD-10-CM

## 2018-07-10 PROCEDURE — 71250 CT THORAX DX C-: CPT

## 2018-09-07 ENCOUNTER — OFFICE VISIT (OUTPATIENT)
Dept: SURGERY | Age: 78
End: 2018-09-07

## 2018-09-07 VITALS
HEIGHT: 61 IN | TEMPERATURE: 96.7 F | BODY MASS INDEX: 25.15 KG/M2 | SYSTOLIC BLOOD PRESSURE: 143 MMHG | OXYGEN SATURATION: 96 % | DIASTOLIC BLOOD PRESSURE: 67 MMHG | HEART RATE: 67 BPM | WEIGHT: 133.2 LBS | RESPIRATION RATE: 16 BRPM

## 2018-09-07 DIAGNOSIS — Z80.3 FAMILY HX-BREAST MALIGNANCY: ICD-10-CM

## 2018-09-07 DIAGNOSIS — Z85.3 PERSONAL HISTORY OF MALIGNANT NEOPLASM OF BREAST: Primary | ICD-10-CM

## 2018-09-07 RX ORDER — DICLOFENAC SODIUM 75 MG/1
75 TABLET, DELAYED RELEASE ORAL
COMMUNITY
Start: 2018-04-27 | End: 2020-07-08

## 2018-09-07 NOTE — PROGRESS NOTES
HISTORY OF PRESENT ILLNESS  Michael Park is a 66 y.o. female who comes in for breast cancer follow up  Breast Cancer   Associated symptoms include headaches and shortness of breath. Pertinent negatives include no chest pain and no abdominal pain. Associated symptoms include headaches and shortness of breath. Pertinent negatives include no chest pain and no abdominal pain. She was diagnosed with a stage 1 ([id number]) left breast cancer on Coty 10, 2005. She had infiltrating lobular carcinoma which was ER pos 65%, [place] negative and her2/thais negative. She was treated with Left lumpectomy and [place] lymph node biopsy, followed by whole breast radiation on B39/0413 clinical trial (Dr Sonali Acosta) and Arimidex (Dr En Salcido). She developed pulmonary infiltrates ultimately requiring lung bx and this was determined to be due to the Arimidex. She was switched over to tamoxifen which she took for five years. She has ongoing breast pain but denies any new changes in her breasts, nipple changes or drainage, unexplained weight loss or bone pain. Breast MRI ([facility]) 10/5/2015 was negative. She had a bilateral mammogram 9/25/2017 which was ok. She had menarche at 5, first of two pregnancies at 29, menopause in her late 45s and did not take HRT. Her sister was dx'd with breast cancer at 72 and her BRCA testing which was negative. She had a breast MRI at the [facility] 10/2016 and was noted to have a 5 mm enhancing suspicious lesion near the lumpectomy bed. MRI bx came back as fibrosis but Dr Owens felt the area was still worrisome and may have been discordant. She strongly recommended excision of the area. She underwent reexcision of the lumpectomy bed 11/2016 which was negative.        Past Medical History:   Diagnosis Date    Anxiety     Arthritis     Breast CA ([facility] [address] 2005    invasive breast lobular ca (radiation, arimidex, then switched tamoxifen)    Breast cancer ([facility] [address] 10/2016    left breast lobular ca, [id number], lumpectomy, ALND, radiation, arimidex but switched to tamoxifen    Diarrhea     Dyspepsia and other specified disorders of function of stomach     GERD (gastroesophageal reflux disease)     Headache(784.0)     Hypertension     Migraine     Osteoporosis     Osteoporosis     Osteoporosis     Premature ventricular contractions (PVCs) (VPCs)     Thyroid disease     Visual loss      Past Surgical History:   Procedure Laterality Date    CHEST SURGERY PROCEDURE UNLISTED  2005    lung bx    HX BREAST BIOPSY Left 12/5/2016    LEFT BREAST NEEDLE LOCALIZATION, EXCISIONAL BIOPSY (CHOICE ANESTH) performed by Dewayne Bermeo MD at [facility] AM[facility]UL[facility] OR    HX BREAST LUMPECTOMY Left 05/2005    and ALND    HX BUNIONECTOMY Bilateral 1972    no hardware    HX CATARACT REMOVAL Bilateral 06/2012    HX GYN      3 D&Cs    HX HYSTERECTOMY  2008    HX LAURA AND BSO       Family History   Problem Relation Age of Onset    Stroke Mother     Heart Disease Mother     Hypertension Mother     Stroke Father     Heart Disease Father     Hypertension Father     Diabetes Brother     Heart Disease Brother     Hypertension Brother     Cancer Sister 76     breast ca    Breast Cancer Sister 76    Cancer Sister 58     endometriosis     Social History   Substance Use Topics    Smoking status: Former Smoker     Quit date: 4/11/1978    Smokeless tobacco: Never Used    Alcohol use Yes      Comment: rarely     Current Outpatient Prescriptions   Medication Sig    diclofenac EC (VOLTAREN) 75 mg EC tablet Take 75 mg by mouth.  SYNTHROID 25 mcg tablet TAKE 1 TABLET ON AN EMPTY STOMACH IN THE MORNING    mometasone-formoterol (DULERA) 200-5 mcg/actuation HFA inhaler Take 2 Puffs by inhalation two (2) times daily as needed.  albuterol (PROAIR HFA) 90 mcg/actuation inhaler Take 2 Puffs by inhalation every four (4) hours as needed for Wheezing.     clonazePAM (KLONOPIN) 0.5 mg tablet Take  by mouth nightly as needed.  fluticasone (FLONASE) 50 mcg/actuation nasal spray nightly as needed.  valsartan (DIOVAN) 80 mg tablet Take 80 mg by mouth two (2) times a day.  atenolol (TENORMIN) 25 mg tablet Take 25 mg by mouth two (2) times a day.  hydrochlorothiazide (HYDRODIURIL) 25 mg tablet Take 25 mg by mouth daily.  pantoprazole (PROTONIX) 40 mg tablet Take 40 mg by mouth daily.  CALCIUM CARBONATE (CALCIUM 600 PO) Take  by mouth.  cholecalciferol, vitamin d3, (VITAMIN D) 1,000 unit tablet Take 4,000 Units by mouth daily.  THYROID,PORK (ARMOUR THYROID PO) Take 15 mcg by mouth. No current facility-administered medications for this visit. Allergies   Allergen Reactions    Donnaphen [Phenobarb-Hyoscy-Atropine-Scop] Rash    Arimidex [Anastrozole] Other (comments)     Wandering pulmonary infiltrates    Levaquin [Levofloxacin] Other (comments)     \"set me wild\"      Erythromycin Other (comments)     Abdominal pain       Review of Systems   Constitutional: Negative for chills, diaphoresis, fever and weight loss. HENT: Positive for congestion. Negative for sore throat. Eyes: Positive for blurred vision. Negative for discharge. Respiratory: Positive for shortness of breath. Negative for cough ( occ) and wheezing. Cardiovascular: Negative for chest pain, palpitations, orthopnea, claudication and leg swelling. Gastrointestinal: Positive for diarrhea and heartburn. Negative for abdominal pain, constipation, melena, nausea and vomiting. Bloating and indigestion  Recent colonoscopy suggesting microcytic colitis   Genitourinary: Negative for dysuria, flank pain, frequency and hematuria. Musculoskeletal: Positive for back pain (compression fractures), joint pain and myalgias. Negative for neck pain. Skin: Negative for rash. Neurological: Positive for headaches. Negative for dizziness, speech change, focal weakness, seizures, loss of consciousness and weakness.    Endo/Heme/Allergies: Bruises/bleeds easily. Psychiatric/Behavioral: Negative for depression and memory loss. Visit Vitals    /67 (BP 1 Location: Right arm, BP Patient Position: Sitting)    Pulse 67    Temp 96.7 °F (35.9 °C) (Oral)    Resp 16    Ht 5' 1\" (1.549 m)    Wt 60.4 kg (133 lb 3.2 oz)    SpO2 96%    BMI 25.17 kg/m2       Physical Exam   Constitutional: She is oriented to person, place, and time. She appears well-developed and well-nourished. No distress. HENT:   Head: Normocephalic and atraumatic. Mouth/Throat: Oropharynx is clear and moist. No oropharyngeal exudate. Eyes: Conjunctivae and EOM are normal. Pupils are equal, round, and reactive to light. No scleral icterus. Neck: Normal range of motion. Neck supple. No JVD present. No tracheal deviation present. No thyromegaly present. Cardiovascular: Normal rate and regular rhythm. Exam reveals no gallop and no friction rub. No murmur heard. Pulmonary/Chest: Effort normal. No respiratory distress. She has no wheezes. She has no rales. Right breast exhibits no inverted nipple, no mass, no nipple discharge, no skin change and no tenderness. Left breast exhibits mass (5 cm seroma in cavity) and tenderness (lumpectomy site). Left breast exhibits no inverted nipple, no nipple discharge and no skin change. Breasts are asymmetrical (slight disfigurement in the lower inner aspect of the left breast). Abdominal: Soft. Bowel sounds are normal. She exhibits no distension and no mass. There is no tenderness. There is no rebound and no guarding. Musculoskeletal: Normal range of motion. She exhibits no edema. Lymphadenopathy:     She has no cervical adenopathy. She has no axillary adenopathy. Right: No supraclavicular adenopathy present. Left: No supraclavicular adenopathy present. Neurological: She is alert and oriented to person, place, and time. No cranial nerve deficit. Skin: Skin is warm and dry. No rash noted.  She is not diaphoretic. No erythema. No pallor. Psychiatric: Her behavior is normal. Judgment and thought content normal.       ASSESSMENT and PLAN  1. Hx stage I breast ca: dx 2005 currently CHAR at 13 years 3 months:    Bilateral 3D screening mammogram after 2018. She desires change to [address] for mammogram  We had a lengthy discussion regarding breast cancer and MRIs. She does not want to pursue them unless absolutely necessary. We discussed her risk profile with her sister with postmenopausal breast cancer and her personal hx of post menopausal breast cancer. She is in a moderate risk category (BRCA negative)  Holding on MRIs  Mammogram due this month  Keep f/u with Eric Cooper   2. Abnormal CT of chest.  Due for repeat CT 2017  3. Social hx.   spring 2018  4. Recent fall with compression fractures in back.   Followed by Dr Franck Cruz 6 months      Marylin Storey MD FACS

## 2018-09-07 NOTE — PATIENT INSTRUCTIONS
Breast Cancer: Care Instructions Your Care Instructions Breast cancer occurs when abnormal cells grow out of control in the breast. These cancer cells can spread within the breast, to nearby lymph nodes and other tissues, and to other parts of the body. Being treated for cancer can weaken your body, and you may feel very tired. Get the rest your body needs so you can feel better. Finding out that you have cancer is scary. You may feel many emotions and may need some help coping. Seek out family, friends, and counselors for support. You also can do things at home to make yourself feel better while you go through treatment. Call the [employer] (5-976.123.3631) or visit its website at [employer] [place] for more information. Follow-up care is a key part of your treatment and safety. Be sure to make and go to all appointments, and call your doctor if you are having problems. It's also a good idea to know your test results and keep a list of the medicines you take. How can you care for yourself at home? · Take your medicines exactly as prescribed. Call your doctor if you think you are having a problem with your medicine. You may get medicine for nausea and vomiting if you have these side effects. · Follow your doctor's instructions to relieve pain. Pain from cancer and surgery can almost always be controlled. Use pain medicine when you first notice pain, before it becomes severe. · Eat healthy food. If you do not feel like eating, try to eat food that has protein and extra calories to keep up your strength and prevent weight loss. Drink liquid meal replacements for extra calories and protein. Try to eat your main meal early. · Get some physical activity every day, but do not get too tired. Keep doing the hobbies you enjoy as your energy allows. · Do not smoke. Smoking can make your cancer worse. If you need help quitting, talk to your doctor about stop-smoking programs and medicines. These can increase your chances of quitting for good. · Take steps to control your stress and workload. Learn relaxation techniques. ¨ Share your feelings. Stress and tension affect our emotions. By expressing your feelings to others, you may be able to understand and cope with them. ¨ Consider joining a support group. Talking about a problem with your spouse, a good friend, or other people with similar problems is a good way to reduce tension and stress. ¨ Express yourself through art. Try writing, crafts, dance, or art to relieve stress. Some dance, writing, or art groups may be available just for people who have cancer. ¨ Be kind to your body and mind. Getting enough sleep, eating a healthy diet, and taking time to do things you enjoy can contribute to an overall feeling of balance in your life and can help reduce stress. ¨ Get help if you need it. Discuss your concerns with your doctor or counselor. · If you are vomiting or have diarrhea: ¨ Drink plenty of fluids (enough so that your urine is light yellow or clear like water) to prevent dehydration. Choose water and other caffeine-free clear liquids. If you have kidney, heart, or liver disease and have to limit fluids, talk with your doctor before you increase the amount of fluids you drink. ¨ When you are able to eat, try clear soups, mild foods, and liquids until all symptoms are gone for 12 to 48 hours. Other good choices include dry toast, crackers, cooked cereal, and gelatin dessert, such as Jell-O. · If you have not already done so, prepare a list of advance directives. Advance directives are instructions to your doctor and family members about what kind of care you want if you become unable to speak or express yourself. When should you call for help? Call 911 anytime you think you may need emergency care. For example, call if: 
  · You passed out (lost consciousness).  
 Call your doctor now or seek immediate medical care if:   · You have a fever.  
  · You have abnormal bleeding.  
  · You think you have an infection.  
  · You have new or worse pain.  
  · You have new symptoms, such as a cough, belly pain, vomiting, diarrhea, or a rash.  
 Watch closely for changes in your health, and be sure to contact your doctor if: 
  · You are much more tired than usual.  
  · You have swollen glands in your armpits, groin, or neck.  
  · You do not get better as expected. Where can you learn more? Go to http://whit-melida.info/. Enter V321 in the search box to learn more about \"Breast Cancer: Care Instructions. \" Current as of: May 12, 2017 Content Version: 11.7 © 4799-9549 [employer], [employer]. Care instructions adapted under license by [employer] (which disclaims liability or warranty for this information). If you have questions about a medical condition or this instruction, always ask your healthcare professional. [address] any warranty or liability for your use of this information.

## 2018-09-07 NOTE — PROGRESS NOTES
Identified pt with two pt identifiers(name and ). Reviewed record in preparation for visit and have obtained necessary documentation. Chief Complaint Patient presents with  Breast Cancer 1 year follow up Health Maintenance Due Topic  DTaP/Tdap/Td series (1 - Tdap)  ZOSTER VACCINE AGE 60>   
 GLAUCOMA SCREENING Q2Y  Bone Densitometry (Dexa) Screening  Pneumococcal 65+ Low/Medium Risk (1 of 2 - PCV13)  MEDICARE YEARLY EXAM   
 Influenza Age 5 to Adult [facility] MAMMOGRAM   
 
Visit Vitals  /67 (BP 1 Location: Right arm, BP Patient Position: Sitting)  Pulse 67  Temp 96.7 °F (35.9 °C) (Oral)  Resp 16  
 Ht 5' 1\" (1.549 m)  Wt 60.4 kg (133 lb 3.2 oz)  SpO2 96%  BMI 25.17 kg/m2 Coordination of Care Questionnaire: 
:  
1) Have you been to an emergency room, urgent care clinic since your last visit? no  
Hospitalized since your last visit? no          
 
2. Have seen or consulted any other health care provider since your last visit? NO If yes, where when, and reason for visit? 3) Do you have an Advanced Directive/ Living Will in place? NO If yes, do we have a copy on file NO If no, would you like information NO Patient is accompanied by self I have received verbal consent from Kendall Mcfarland to discuss any/all medical information while they are present in the room.  
 
Stefanie Altamirano LPN

## 2018-09-07 NOTE — MR AVS SNAPSHOT
[address] [facility], [address] [facility], [address] [place] [address] [facility] 
[phone] Patient: Michael Park MRN: [id number] FN:[id number] Visit Information Date & Time Provider Department Dept. Phone Encounter #  
 9/7/2018  8:00 AM Sin Selby MD [facility] [address] [id number] Your Appointments 9/6/2019  8:00 AM  
ESTABLISHED PATIENT with Sin Selby MD  
[facility] ([facility]) Appt Note: 1 year breast check [address] [place] [address], [address] [phone]  
[address] [facility], [address] [place] [address] [address] [phone] Upcoming Health Maintenance Date Due DTaP/Tdap/Td series (1 - Tdap) 5/24/1961 ZOSTER VACCINE AGE 60> 3/24/2000 GLAUCOMA SCREENING Q2Y 5/24/2005 Bone Densitometry (Dexa) Screening 5/24/2005 Pneumococcal 65+ Low/Medium Risk (1 of 2 - PCV13) 5/24/2005 MEDICARE YEARLY EXAM 3/14/2018 Influenza Age 5 to Adult 8/1/2018 BREAST CANCER [facility] MAMMOGRAM 9/25/2018 Allergies as of 9/7/2018  Review Complete On: 9/7/2018 By: Sin Selby MD  
  
 Severity Noted Reaction Type Reactions Donnaphen [Phenobarb-hyoscy-atropine-scop] Medium 04/11/2011   Systemic Rash Arimidex [Anastrozole]  04/16/2012   Systemic Other (comments) Wandering pulmonary infiltrates Levaquin [Levofloxacin]  11/22/2016    Other (comments) \"set me wild\" Erythromycin Low 04/11/2011   Intolerance Other (comments) Abdominal pain Current Immunizations  Reviewed on 9/7/2018 No immunizations on file. Reviewed by Lizeth Reed LPN on 9/7/9871 at  3:86 AM  
You Were Diagnosed With   
  
 Codes Comments Personal history of malignant neoplasm of breast    -  Primary ICD-10-CM: Z85.3 ICD-9-CM: V10.3 Family hx-breast malignancy     ICD-10-CM: Z80.3 ICD-9-CM: V16.3 Vitals BP Pulse Temp Resp Height(growth percentile) Weight(growth percentile) 143/67 (BP 1 Location: Right arm, BP Patient Position: Sitting) 67 96.7 °F (35.9 °C) (Oral) 16 5' 1\" (1.549 m) 133 lb 3.2 oz (60.4 kg) SpO2 BMI OB Status Smoking Status 96% 25.17 kg/m2 Hysterectomy Former Smoker BMI and BSA Data Body Mass Index Body Surface Area  
 25.17 kg/m 2 1.61 m 2 Preferred Pharmacy Pharmacy Name Phone [id number] [phone] Your Updated Medication List  
  
   
This list is accurate as of 9/7/18  8:41 AM.  Always use your most recent med list.  
  
  
  
  
 ARMOUR THYROID PO Take 15 mcg by mouth. atenolol 25 mg tablet Commonly known as:  TENORMIN Take 25 mg by mouth two (2) times a day. CALCIUM 600 PO Take  by mouth.  
  
 clonazePAM 0.5 mg tablet Commonly known as:  Nahed Clines Take  by mouth nightly as needed. diclofenac EC 75 mg EC tablet Commonly known as:  VOLTAREN Take 75 mg by mouth. DIOVAN 80 mg tablet Generic drug:  valsartan Take 80 mg by mouth two (2) times a day. DULERA 200-5 mcg/actuation HFA inhaler Generic drug:  mometasone-formoterol Take 2 Puffs by inhalation two (2) times daily as needed. fluticasone 50 mcg/actuation nasal spray Commonly known as:  FLONASE  
nightly as needed. hydroCHLOROthiazide 25 mg tablet Commonly known as:  HYDRODIURIL Take 25 mg by mouth daily. PROAIR HFA 90 mcg/actuation inhaler Generic drug:  albuterol Take 2 Puffs by inhalation every four (4) hours as needed for Wheezing. PROTONIX 40 mg tablet Generic drug:  pantoprazole Take 40 mg by mouth daily. synthroid 25 mcg tablet Generic drug:  levothyroxine TAKE 1 TABLET ON AN EMPTY STOMACH IN THE MORNING  
  
 VITAMIN D3 1,000 unit tablet Generic drug:  cholecalciferol Take 4,000 Units by mouth daily. To-Do List   
 09/27/2018 10:20 AM  
  Appointment with [facility] MIRELLA 3 at [address] [place] [address] ([phone]) Shower or bathe using soap and water. Do not use deodorant, powder, perfumes, or lotion the day of your exam.  If your prior mammograms were not performed at [facility] 6 please bring films with you or forward prior images 2 days before your procedure. Check in at registration 15min before your appointment time unless you were instructed to do otherwise. A script is not necessary, but if you have one, please bring it on the day of the mammogram or have it faxed to the department. You are responsible for finding a method of transportation to your appointment. If you don't have transportation, please reschedule your appointment at least 24 hours in advance. [facility] [phone] [facility]  [phone] [facility] [phone] [facility]  [phone] Critical access hospital [phone] hospitals [phone] [facility] [phone] [facility] [phone]  
  
 07/11/2019 10:00 AM  
  Appointment with [facility] 2 at hospitals RAD CT ([phone]) NON-CONTRAST STUDY: 1. Bring any non [facility] facility films/images pertaining to the area of interest with you on the day of appointment. 2. Check in at registration at least 30 minutes before appt time unless you were instructed to do otherwise. 3. If you have to drink oral contrast please pick it up any weekday prior to your appointment, if you cannot please check in 2 hrs  before appt time. Patient Instructions Breast Cancer: Care Instructions Your Care Instructions Breast cancer occurs when abnormal cells grow out of control in the breast. These cancer cells can spread within the breast, to nearby lymph nodes and other tissues, and to other parts of the body. Being treated for cancer can weaken your body, and you may feel very tired. Get the rest your body needs so you can feel better. Finding out that you have cancer is scary. You may feel many emotions and may need some help coping. Seek out family, friends, and counselors for support. You also can do things at home to make yourself feel better while you go through treatment. Call the Kristina Rodriguse (0-669.269.3196) or visit its website at [address] [employer] [address]. Slanissue for more information. Follow-up care is a key part of your treatment and safety. Be sure to make and go to all appointments, and call your doctor if you are having problems. It's also a good idea to know your test results and keep a list of the medicines you take. How can you care for yourself at home? · Take your medicines exactly as prescribed. Call your doctor if you think you are having a problem with your medicine. You may get medicine for nausea and vomiting if you have these side effects. · Follow your doctor's instructions to relieve pain. Pain from cancer and surgery can almost always be controlled. Use pain medicine when you first notice pain, before it becomes severe. · Eat healthy food. If you do not feel like eating, try to eat food that has protein and extra calories to keep up your strength and prevent weight loss. Drink liquid meal replacements for extra calories and protein. Try to eat your main meal early. · Get some physical activity every day, but do not get too tired. Keep doing the hobbies you enjoy as your energy allows. · Do not smoke. Smoking can make your cancer worse. If you need help quitting, talk to your doctor about stop-smoking programs and medicines. These can increase your chances of quitting for good. · Take steps to control your stress and workload. Learn relaxation techniques. ¨ Share your feelings. Stress and tension affect our emotions. By expressing your feelings to others, you may be able to understand and cope with them. ¨ Consider joining a support group.  Talking about a problem with your spouse, a good friend, or other people with similar problems is a good way to reduce tension and stress. ¨ Express yourself through art. Try writing, crafts, dance, or art to relieve stress. Some dance, writing, or art groups may be available just for people who have cancer. ¨ Be kind to your body and mind. Getting enough sleep, eating a healthy diet, and taking time to do things you enjoy can contribute to an overall feeling of balance in your life and can help reduce stress. ¨ Get help if you need it. Discuss your concerns with your doctor or counselor. · If you are vomiting or have diarrhea: ¨ Drink plenty of fluids (enough so that your urine is light yellow or clear like water) to prevent dehydration. Choose water and other caffeine-free clear liquids. If you have kidney, heart, or liver disease and have to limit fluids, talk with your doctor before you increase the amount of fluids you drink. ¨ When you are able to eat, try clear soups, mild foods, and liquids until all symptoms are gone for 12 to 48 hours. Other good choices include dry toast, crackers, cooked cereal, and gelatin dessert, such as Jell-O. · If you have not already done so, prepare a list of advance directives. Advance directives are instructions to your doctor and family members about what kind of care you want if you become unable to speak or express yourself. When should you call for help? Call 911 anytime you think you may need emergency care.  For example, call if: 
  · You passed out (lost consciousness).  
 Call your doctor now or seek immediate medical care if: 
  · You have a fever.  
  · You have abnormal bleeding.  
  · You think you have an infection.  
  · You have new or worse pain.  
  · You have new symptoms, such as a cough, belly pain, vomiting, diarrhea, or a rash.  
 Watch closely for changes in your health, and be sure to contact your doctor if: 
  · You are much more tired than usual.  
   · You have swollen glands in your armpits, groin, or neck.  
  · You do not get better as expected. Where can you learn more? Go to http://whit-melida.info/. Enter V321 in the search box to learn more about \"Breast Cancer: Care Instructions. \" Current as of: May 12, 2017 Content Version: 11.7 © 0073-3591 Geo[employer]. Care instructions adapted under license by [employer] (which disclaims liability or warranty for this information). If you have questions about a medical condition or this instruction, always ask your healthcare professional. [address] any warranty or liability for your use of this information. Introducing [facility]! [employer] introduces SinCola patient portal. Now you can access parts of your medical record, email your doctor's office, and request medication refills online. 1. In your internet browser, go to [email or web address] 2. Click on the First Time User? Click Here link in the Sign In box. You will see the New Member Sign Up page. 3. Enter your SinCola Access Code exactly as it appears below. You will not need to use this code after youve completed the sign-up process. If you do not sign up before the expiration date, you must request a new code. · SinCola Access Code: [email or web address] Expires: 12/6/2018  8:07 AM 
 
4. Enter the last four digits of your Social Security Number (xxxx) and Date of Birth (mm/dd/yyyy) as indicated and click Submit. You will be taken to the next sign-up page. 5. Create a Buddyt ID. This will be your SinCola login ID and cannot be changed, so think of one that is secure and easy to remember. 6. Create a SinCola password. You can change your password at any time. 7. Enter your Password Reset Question and Answer. This can be used at a later time if you forget your password. 8. Enter your e-mail address. You will receive e-mail notification when new information is available in [address] 9. Click Sign Up. You can now view and download portions of your medical record. 10. Click the Download Summary menu link to download a portable copy of your medical information. If you have questions, please visit the Frequently Asked Questions section of the CitiSent website. Remember, CitiSent is NOT to be used for urgent needs. For medical emergencies, dial 911. Now available from your iPhone and Android! Please provide this summary of care documentation to your next provider. Your primary care clinician is listed as Carlos Robles. If you have any questions after today's visit, please call [phone].

## 2018-09-27 ENCOUNTER — HOSPITAL ENCOUNTER (OUTPATIENT)
Dept: MAMMOGRAPHY | Age: 78
Discharge: HOME | End: 2018-09-27
Attending: RADIOLOGY
Payer: MEDICARE

## 2018-09-27 DIAGNOSIS — Z12.39 SCREENING BREAST EXAMINATION: ICD-10-CM

## 2018-09-27 PROCEDURE — 77067 SCR MAMMO BI INCL CAD: CPT

## 2019-03-04 ENCOUNTER — OFFICE VISIT (OUTPATIENT)
Dept: SURGERY | Age: 79
End: 2019-03-04

## 2019-03-04 VITALS
HEIGHT: 61 IN | TEMPERATURE: 98.1 F | RESPIRATION RATE: 20 BRPM | BODY MASS INDEX: 25.3 KG/M2 | HEART RATE: 89 BPM | OXYGEN SATURATION: 98 % | SYSTOLIC BLOOD PRESSURE: 161 MMHG | DIASTOLIC BLOOD PRESSURE: 75 MMHG | WEIGHT: 134 LBS

## 2019-03-04 DIAGNOSIS — Z85.3 PERSONAL HISTORY OF MALIGNANT NEOPLASM OF BREAST: Primary | ICD-10-CM

## 2019-03-04 DIAGNOSIS — Z12.31 SCREENING MAMMOGRAM, ENCOUNTER FOR: ICD-10-CM

## 2019-03-04 NOTE — PROGRESS NOTES
HISTORY OF PRESENT ILLNESS  Jaciel Sargent is a 66 y.o. female who comes in for breast cancer follow up  Breast Cancer   Associated symptoms include headaches and shortness of breath. Pertinent negatives include no chest pain and no abdominal pain. Follow-up   Associated symptoms include headaches and shortness of breath. Pertinent negatives include no chest pain and no abdominal pain. She was diagnosed with a stage 1 ([id number]) left breast cancer on Coty 10, 2005. She had infiltrating lobular carcinoma which was ER pos 65%, ND negative and her2/thais negative. She was treated with Left lumpectomy and [place] lymph node biopsy, followed by whole breast radiation on B39/0413 clinical trial (Dr Fam Aguilera) and Arimidex (Dr Galo Kern). She developed pulmonary infiltrates ultimately requiring lung bx and this was determined to be due to the Arimidex. She was switched over to tamoxifen which she took for five years. She has ongoing breast pain but denies any new changes in her breasts, nipple changes or drainage, unexplained weight loss or bone pain. Breast MRI [facility]) 10/5/2015 was negative. She had a bilateral mammogram 9/27/2018 which was BIRADS 1. She had menarche at 5, first of two pregnancies at 29, menopause in her late 45s and did not take HRT. Her sister was dx'd with breast cancer at 72 and her BRCA testing which was negative. She had a breast MRI at the [facility] 10/2016 and was noted to have a 5 mm enhancing suspicious lesion near the lumpectomy bed. MRI bx came back as fibrosis but Dr Owens felt the area was still worrisome and may have been discordant. She strongly recommended excision of the area. She underwent reexcision of the lumpectomy bed 11/2016 which was negative.        Past Medical History:   Diagnosis Date    Anxiety     Arthritis     Breast CA ([facility] 2005    invasive breast lobular ca (radiation, arimidex, then switched tamoxifen)    Breast cancer ([facility] [address] 10/2016    left breast lobular ca, [id number], lumpectomy, ALND, radiation, arimidex but switched to tamoxifen    Diarrhea     Dyspepsia and other specified disorders of function of stomach     GERD (gastroesophageal reflux disease)     Headache(784.0)     Hypertension     Migraine     Osteoporosis     Osteoporosis     Osteoporosis     Premature ventricular contractions (PVCs) (VPCs)     Thyroid disease     Visual loss      Past Surgical History:   Procedure Laterality Date    CHEST SURGERY PROCEDURE UNLISTED      lung bx    HX BREAST BIOPSY Left 2016    LEFT BREAST NEEDLE LOCALIZATION, EXCISIONAL BIOPSY (CHOICE ANESTH) performed by Patricia Celestin [facility]    HX BREAST LUMPECTOMY Left 2005    and ALND    HX BUNIONECTOMY Bilateral     no hardware    HX CATARACT REMOVAL Bilateral 2012    HX GYN      3 D&Cs    HX HYSTERECTOMY      HX LAURA AND BSO       Family History   Problem Relation Age of Onset    Stroke Mother     Heart Disease Mother     Hypertension Mother     Stroke Father     Heart Disease Father     Hypertension Father     Diabetes Brother     Heart Disease Brother     Hypertension Brother     Cancer Sister 76        breast ca    Breast Cancer Sister 76    Cancer Sister 58        endometriosis     Social History     Tobacco Use    Smoking status: Former Smoker     Last attempt to quit: 1978     Years since quittin.9    Smokeless tobacco: Never Used   Substance Use Topics    Alcohol use: Yes     Comment: rarely    Drug use: No     Current Outpatient Medications   Medication Sig    SYNTHROID 25 mcg tablet TAKE 1 TABLET ON AN EMPTY STOMACH IN THE MORNING    mometasone-formoterol (DULERA) 200-5 mcg/actuation HFA inhaler Take 2 Puffs by inhalation two (2) times daily as needed.  clonazePAM (KLONOPIN) 0.5 mg tablet Take  by mouth nightly as needed.  valsartan (DIOVAN) 80 mg tablet Take 80 mg by mouth two (2) times a day.     atenolol (TENORMIN) 25 mg tablet Take 25 mg by mouth two (2) times a day.  hydrochlorothiazide (HYDRODIURIL) 25 mg tablet Take 25 mg by mouth daily.  pantoprazole (PROTONIX) 40 mg tablet Take 40 mg by mouth daily.  CALCIUM CARBONATE (CALCIUM 600 PO) Take  by mouth.  cholecalciferol, vitamin d3, (VITAMIN D) 1,000 unit tablet Take 4,000 Units by mouth daily.  diclofenac EC (VOLTAREN) 75 mg EC tablet Take 75 mg by mouth.  albuterol (PROAIR HFA) 90 mcg/actuation inhaler Take 2 Puffs by inhalation every four (4) hours as needed for Wheezing.  THYROID,PORK (ARMOUR THYROID PO) Take 15 mcg by mouth.  fluticasone (FLONASE) 50 mcg/actuation nasal spray nightly as needed. No current facility-administered medications for this visit. Allergies   Allergen Reactions    Donnaphen [Phenobarb-Hyoscy-Atropine-Scop] Rash    Arimidex [Anastrozole] Other (comments)     Wandering pulmonary infiltrates    Levaquin [Levofloxacin] Other (comments)     \"set me wild\"      Erythromycin Other (comments)     Abdominal pain       Review of Systems   Constitutional: Negative for chills, diaphoresis, fever and weight loss. HENT: Positive for congestion. Negative for sore throat. Eyes: Positive for blurred vision. Negative for discharge. Respiratory: Positive for shortness of breath. Negative for cough ( occ) and wheezing. Cardiovascular: Negative for chest pain, palpitations, orthopnea, claudication and leg swelling. Gastrointestinal: Positive for diarrhea and heartburn. Negative for abdominal pain, constipation, melena, nausea and vomiting. Bloating and indigestion  Recent colonoscopy suggesting microcytic colitis   Genitourinary: Negative for dysuria, flank pain, frequency and hematuria. Musculoskeletal: Positive for back pain (compression fractures), joint pain and myalgias. Negative for neck pain. Skin: Negative for rash. Neurological: Positive for headaches.  Negative for dizziness, speech change, focal weakness, seizures, loss of consciousness and weakness. Endo/Heme/Allergies: Bruises/bleeds easily. Psychiatric/Behavioral: Negative for depression and memory loss. Visit Vitals  /75 (BP 1 Location: Right arm, BP Patient Position: Sitting)   Pulse 89   Temp 98.1 °F (36.7 °C)   Resp 20   Ht 5' 1\" (1.549 m)   Wt 60.8 kg (134 lb)   SpO2 98%   BMI 25.32 kg/m²       Physical Exam   Constitutional: She is oriented to person, place, and time. She appears well-developed and well-nourished. No distress. HENT:   Head: Normocephalic and atraumatic. Mouth/Throat: Oropharynx is clear and moist. No oropharyngeal exudate. Eyes: Conjunctivae and EOM are normal. Pupils are equal, round, and reactive to light. No scleral icterus. Neck: Normal range of motion. Neck supple. No JVD present. No tracheal deviation present. No thyromegaly present. Cardiovascular: Normal rate and regular rhythm. Exam reveals no gallop and no friction rub. No murmur heard. Pulmonary/Chest: Effort normal. No respiratory distress. She has no wheezes. She has no rales. Right breast exhibits no inverted nipple, no mass, no nipple discharge, no skin change and no tenderness. Left breast exhibits mass (5 cm seroma in cavity) and tenderness (lumpectomy site). Left breast exhibits no inverted nipple, no nipple discharge and no skin change. Breasts are asymmetrical (slight disfigurement in the lower inner aspect of the left breast). Abdominal: Soft. Bowel sounds are normal. She exhibits no distension and no mass. There is no tenderness. There is no rebound and no guarding. Musculoskeletal: Normal range of motion. She exhibits no edema. Lymphadenopathy:     She has no cervical adenopathy. She has no axillary adenopathy. Right: No supraclavicular adenopathy present. Left: No supraclavicular adenopathy present. Neurological: She is alert and oriented to person, place, and time.  No cranial nerve deficit. Skin: Skin is warm and dry. No rash noted. She is not diaphoretic. No erythema. No pallor. Psychiatric: Her behavior is normal. Judgment and thought content normal.       ASSESSMENT and PLAN  1. Hx stage I breast ca: dx 2005 currently CHAR at 13 years 3 months:    Bilateral 3D screening mammogram after 2018. She desires change to [id number] for mammogram  We had a lengthy discussion regarding breast cancer and MRIs. She does not want to pursue them unless absolutely necessary. We discussed her risk profile with her sister with postmenopausal breast cancer and her personal hx of post menopausal breast cancer. She is in a moderate risk category (BRCA negative)  Holding on MRIs  Mammogram due 2019  Keep f/u with Eric Kern   2. Essential hypertension  3. Social hx.   spring 2018  4.  compression fractures in back.  Still with pain  Followed by Dr Kofi Montilla  RTC 1 year      Loraine Escobedo MD FACS

## 2019-03-04 NOTE — PROGRESS NOTES
Chief Complaint   Patient presents with    Breast Cancer     1 year breast check       1. Have you been to the ER, urgent care clinic since your last visit? Hospitalized since your last visit? No    2. Have you seen or consulted any other health care providers outside of the [address] [place] [address] since your last visit? Include any pap smears or colon screening.  No

## 2019-07-11 ENCOUNTER — HOSPITAL ENCOUNTER (OUTPATIENT)
Dept: CT IMAGING | Age: 79
Discharge: HOME | End: 2019-07-11
Attending: INTERNAL MEDICINE
Payer: MEDICARE

## 2019-07-11 DIAGNOSIS — R91.1 SOLITARY PULMONARY NODULE: ICD-10-CM

## 2019-07-11 PROCEDURE — 71250 CT THORAX DX C-: CPT

## 2019-09-30 ENCOUNTER — HOSPITAL ENCOUNTER (OUTPATIENT)
Dept: MAMMOGRAPHY | Age: 79
Discharge: HOME | End: 2019-09-30
Attending: SURGERY
Payer: MEDICARE

## 2019-09-30 DIAGNOSIS — Z85.3 PERSONAL HISTORY OF MALIGNANT NEOPLASM OF BREAST: ICD-10-CM

## 2019-09-30 DIAGNOSIS — Z12.31 SCREENING MAMMOGRAM, ENCOUNTER FOR: ICD-10-CM

## 2019-09-30 PROCEDURE — 77067 SCR MAMMO BI INCL CAD: CPT

## 2019-10-02 ENCOUNTER — HOSPITAL ENCOUNTER (OUTPATIENT)
Dept: ULTRASOUND IMAGING | Age: 79
Discharge: HOME | End: 2019-10-02
Attending: SURGERY
Payer: MEDICARE

## 2019-10-02 ENCOUNTER — TELEPHONE (OUTPATIENT)
Dept: SURGERY | Age: 79
End: 2019-10-02

## 2019-10-02 ENCOUNTER — HOSPITAL ENCOUNTER (OUTPATIENT)
Dept: MAMMOGRAPHY | Age: 79
Discharge: HOME | End: 2019-10-02
Attending: SURGERY
Payer: MEDICARE

## 2019-10-02 DIAGNOSIS — R92.8 ABNORMAL MAMMOGRAM: ICD-10-CM

## 2019-10-02 DIAGNOSIS — R92.8 ABNORMAL MAMMOGRAM: Primary | ICD-10-CM

## 2019-10-02 PROCEDURE — 77065 DX MAMMO INCL CAD UNI: CPT

## 2019-10-02 NOTE — TELEPHONE ENCOUNTER
Spoke with patient  Repeat left dx mammogram 6 months  Keep regular f/u with me      Kary Yusuf MD FACS

## 2019-10-03 ENCOUNTER — TELEPHONE (OUTPATIENT)
Dept: SURGERY | Age: 79
End: 2019-10-03

## 2019-10-03 DIAGNOSIS — R92.8 ABNORMAL MAMMOGRAM OF LEFT BREAST: Primary | ICD-10-CM

## 2019-10-03 NOTE — TELEPHONE ENCOUNTER
Dr Macario Naranjo called pt 10-2 said [place] was good,but Pt still has questions and would like to talk to Dr. Macario Naranjo.

## 2019-10-03 NOTE — TELEPHONE ENCOUNTER
Spoke with patient she wants to know if you actually looked at the films for mammogram?  She wants to know if she is ok and wants to know if ok to wait 6months before next mammogram.  Please call.

## 2019-10-04 NOTE — TELEPHONE ENCOUNTER
Michael Garza MD  You 20 hours ago (7:18 PM)      Yes I did     Stephen Ponce MD FACS         Patient aware provider looked at the films. She wants to know if she is okay? Please advise.

## 2020-03-11 ENCOUNTER — TELEPHONE (OUTPATIENT)
Dept: SURGERY | Age: 80
End: 2020-03-11

## 2020-03-11 NOTE — TELEPHONE ENCOUNTER
Instructed pt to keep appointment on 4/13/20 @ 10:15am @ [facility] as discussed with Maame Aquino on 10/9/2019. expressed understanding.

## 2020-03-11 NOTE — TELEPHONE ENCOUNTER
Patient wants to know why she has to wait 6 months to get a 3D diagnostic mammogram when she has invasive breast cancer, and why she has to [facility], wants to know if 3D is done at [facility]

## 2020-04-20 ENCOUNTER — TELEPHONE (OUTPATIENT)
Dept: SURGERY | Age: 80
End: 2020-04-20

## 2020-04-20 NOTE — TELEPHONE ENCOUNTER
Patient has her mammo scheduled for next Thursday 04/30. She has several questions. 1- She wants to know why Dr Princess Young didn't schedule a biopsy back in October after her mammo showed something questionable. 2- Should she reschedule her mammo to a later date due to the corona virus? She is a nervous wreck that she might catch the virus if she leaves her house, but she is also nervous that she has cancer. She would like a call back from Dr Princess Young.

## 2020-04-21 NOTE — TELEPHONE ENCOUNTER
Given the Covid-19 pandemic, I think waiting another 1-2 months prior getting the left dx mammogram      Shiva Foster MD FACS

## 2020-06-08 ENCOUNTER — HOSPITAL ENCOUNTER (OUTPATIENT)
Dept: MAMMOGRAPHY | Age: 80
Discharge: HOME | End: 2020-06-08
Attending: SURGERY
Payer: MEDICARE

## 2020-06-08 DIAGNOSIS — R92.8 ABNORMAL MAMMOGRAM: ICD-10-CM

## 2020-06-08 PROCEDURE — 77065 DX MAMMO INCL CAD UNI: CPT

## 2020-07-08 ENCOUNTER — OFFICE VISIT (OUTPATIENT)
Dept: SURGERY | Age: 80
End: 2020-07-08

## 2020-07-08 VITALS
HEART RATE: 63 BPM | BODY MASS INDEX: 24.92 KG/M2 | DIASTOLIC BLOOD PRESSURE: 70 MMHG | HEIGHT: 61 IN | WEIGHT: 132 LBS | SYSTOLIC BLOOD PRESSURE: 154 MMHG | TEMPERATURE: 98.4 F | RESPIRATION RATE: 18 BRPM | OXYGEN SATURATION: 96 %

## 2020-07-08 DIAGNOSIS — Z85.3 PERSONAL HISTORY OF MALIGNANT NEOPLASM OF BREAST: Primary | ICD-10-CM

## 2020-07-08 DIAGNOSIS — Z12.31 SCREENING MAMMOGRAM FOR HIGH-RISK PATIENT: ICD-10-CM

## 2020-07-08 NOTE — PROGRESS NOTES
HISTORY OF PRESENT ILLNESS  Serapio Kussmaul is a [de-identified] y.o. female who comes in for breast cancer follow up  Breast Cancer   Associated symptoms include headaches and shortness of breath. Pertinent negatives include no chest pain and no abdominal pain. Follow-up   Associated symptoms include headaches and shortness of breath. Pertinent negatives include no chest pain and no abdominal pain. Surgical Follow-up   Associated symptoms include headaches and shortness of breath. Pertinent negatives include no chest pain and no abdominal pain. She was diagnosed with a stage 1 ([id number]) left breast cancer on Coty 10, 2005. She had infiltrating lobular carcinoma which was ER pos 65%, NE negative and her2/thais negative. She was treated with Left lumpectomy and [place] lymph node biopsy, followed by whole breast radiation on B39/0413 clinical trial (Dr Erica Robbins) and Arimidex (Dr Melvina Cali). She developed pulmonary infiltrates ultimately requiring lung bx and this was determined to be due to the Arimidex. She was switched over to tamoxifen which she took for five years. She has ongoing breast pain but denies any new changes in her breasts, nipple changes or drainage, unexplained weight loss or bone pain. Breast MRI ([facility]) 10/5/2015 was negative. She had a bilateral mammogram 9/27/2018 which was BIRADS 1. She had menarche at 5, first of two pregnancies at 29, menopause in her late 45s and did not take HRT. Her sister was dx'd with breast cancer at 72 and her BRCA testing which was negative. She had a breast MRI at the [facility] 10/2016 and was noted to have a 5 mm enhancing suspicious lesion near the lumpectomy bed. MRI bx came back as fibrosis but Dr Owens felt the area was still worrisome and may have been discordant. She strongly recommended excision of the area. She underwent reexcision of the lumpectomy bed 11/2016 which was negative.    Left dx mammogram 6/8/2020 was BIRADS 2 after several 6 month follow ups.     Past Medical History:   Diagnosis Date    Anxiety     Arthritis     Breast CA ([facility] [address]     invasive breast lobular ca (radiation, arimidex, then switched tamoxifen)    Breast cancer ([facility] 10/2016    left breast lobular ca, [id number], lumpectomy, ALND, radiation, arimidex but switched to tamoxifen    Diarrhea     Dyspepsia and other specified disorders of function of stomach     GERD (gastroesophageal reflux disease)     Headache(784.0)     Hypertension     Migraine     Osteoporosis     Osteoporosis     Osteoporosis     Premature ventricular contractions (PVCs) (VPCs)     Thyroid disease     Visual loss      Past Surgical History:   Procedure Laterality Date    CHEST SURGERY PROCEDURE UNLISTED      lung bx    HX BREAST BIOPSY Left 2016    LEFT BREAST NEEDLE LOCALIZATION, EXCISIONAL BIOPSY (CHOICE ANESTH) performed by Cookie Everett MD at [facility]    HX BREAST LUMPECTOMY Left 2005    and ALND    HX BUNIONECTOMY Bilateral     no hardware    HX CATARACT REMOVAL Bilateral 2012    HX GYN      3 D&Cs    HX HYSTERECTOMY      HX LAURA AND BSO       Family History   Problem Relation Age of Onset    Stroke Mother     Heart Disease Mother     Hypertension Mother     Stroke Father     Heart Disease Father     Hypertension Father     Diabetes Brother     Heart Disease Brother     Hypertension Brother     Cancer Sister 76        breast ca    Breast Cancer Sister 76    Cancer Sister 58        endometriosis     Social History     Tobacco Use    Smoking status: Former Smoker     Last attempt to quit: 1978     Years since quittin.2    Smokeless tobacco: Never Used   Substance Use Topics    Alcohol use: Yes     Comment: rarely    Drug use: No     Current Outpatient Medications   Medication Sig    SYNTHROID 25 mcg tablet TAKE 1 TABLET ON AN EMPTY STOMACH IN THE MORNING    mometasone-formoterol (DULERA) 200-5 mcg/actuation HFA inhaler Take 2 Puffs by inhalation two (2) times daily as needed.  albuterol (PROAIR HFA) 90 mcg/actuation inhaler Take 2 Puffs by inhalation every four (4) hours as needed for Wheezing.  clonazePAM (KLONOPIN) 0.5 mg tablet Take  by mouth nightly as needed.  fluticasone (FLONASE) 50 mcg/actuation nasal spray nightly as needed.  valsartan (DIOVAN) 80 mg tablet Take 80 mg by mouth two (2) times a day.  atenolol (TENORMIN) 25 mg tablet Take 25 mg by mouth two (2) times a day.  hydrochlorothiazide (HYDRODIURIL) 25 mg tablet Take 25 mg by mouth daily.  pantoprazole (PROTONIX) 40 mg tablet Take 40 mg by mouth daily.  CALCIUM CARBONATE (CALCIUM 600 PO) Take  by mouth.  cholecalciferol, vitamin d3, (VITAMIN D) 1,000 unit tablet Take 4,000 Units by mouth daily.  THYROID,PORK (ARMOUR THYROID PO) Take 15 mcg by mouth. No current facility-administered medications for this visit. Allergies   Allergen Reactions    Donnaphen [Phenobarb-Hyoscy-Atropine-Scop] Rash    Arimidex [Anastrozole] Other (comments)     Wandering pulmonary infiltrates    Levaquin [Levofloxacin] Other (comments)     \"set me wild\"      Voltaren [Diclofenac Sodium] Rash    Zithromax [Azithromycin] Hives and Nausea and Vomiting    Erythromycin Other (comments)     Abdominal pain       Review of Systems   Constitutional: Negative for chills, diaphoresis, fever and weight loss. HENT: Positive for congestion. Negative for sore throat. Eyes: Positive for blurred vision. Negative for discharge. Respiratory: Positive for shortness of breath. Negative for cough ( occ) and wheezing. Cardiovascular: Negative for chest pain, palpitations, orthopnea, claudication and leg swelling. Gastrointestinal: Positive for diarrhea and heartburn. Negative for abdominal pain, constipation, melena, nausea and vomiting.         Bloating and indigestion  Recent colonoscopy suggesting microcytic colitis   Genitourinary: Negative for dysuria, flank pain, frequency and hematuria. Musculoskeletal: Positive for back pain (compression fractures), joint pain and myalgias. Negative for neck pain. Skin: Negative for rash. Neurological: Positive for headaches. Negative for dizziness, speech change, focal weakness, seizures, loss of consciousness and weakness. Endo/Heme/Allergies: Bruises/bleeds easily. Psychiatric/Behavioral: Negative for depression and memory loss. Visit Vitals  /85 (BP 1 Location: Right arm, BP Patient Position: Sitting)   Pulse 63   Temp 98.4 °F (36.9 °C) (Oral)   Resp 18   Ht 5' 1\" (1.549 m)   Wt 59.9 kg (132 lb)   SpO2 96%   BMI 24.94 kg/m²       Physical Exam  Constitutional:       General: She is not in acute distress. Appearance: She is well-developed. She is not diaphoretic. HENT:      Head: Normocephalic and atraumatic. Mouth/Throat:      Pharynx: No oropharyngeal exudate. Eyes:      General: No scleral icterus. Conjunctiva/sclera: Conjunctivae normal.      Pupils: Pupils are equal, round, and reactive to light. Neck:      Musculoskeletal: Normal range of motion and neck supple. Thyroid: No thyromegaly. Vascular: No JVD. Trachea: No tracheal deviation. Cardiovascular:      Rate and Rhythm: Normal rate and regular rhythm. Heart sounds: No murmur. No friction rub. No gallop. Pulmonary:      Effort: Pulmonary effort is normal. No respiratory distress. Breath sounds: No wheezing or rales. Chest:      Breasts: Breasts are asymmetrical (slight disfigurement in the lower inner aspect of the left breast). Right: No inverted nipple, mass, nipple discharge, skin change or tenderness. Left: Mass (5 cm seroma in cavity) and tenderness (lumpectomy site) present. No inverted nipple, nipple discharge or skin change. Abdominal:      General: Bowel sounds are normal. There is no distension. Palpations: Abdomen is soft. There is no mass.       Tenderness: There is no abdominal tenderness. There is no guarding or rebound. Musculoskeletal: Normal range of motion. Lymphadenopathy:      Cervical: No cervical adenopathy. Upper Body:      Right upper body: No supraclavicular adenopathy. Left upper body: No supraclavicular adenopathy. Skin:     General: Skin is warm and dry. Coloration: Skin is not pale. Findings: No erythema or rash. Neurological:      Mental Status: She is alert and oriented to person, place, and time. Cranial Nerves: No cranial nerve deficit. Psychiatric:         Behavior: Behavior normal.         Thought Content: Thought content normal.         Judgment: Judgment normal.         ASSESSMENT and PLAN  1. Hx stage I breast ca: dx 2005 currently CHAR at 15 years 1 months:    Bilateral 3D screening mammogram 2020   She is in a moderate risk category (BRCA negative)  Holding on MRIs    Keep f/u with Eric Dee   2. Essential hypertension  3. Social hx.   spring 2018  4.  compression fractures in back.  Still with pain  Followed by Dr Bennett Essex    RTC 1 year      Early MD Luis Enrique FACS

## 2020-11-19 ENCOUNTER — TRANSCRIBE ORDER (OUTPATIENT)
Dept: SCHEDULING | Age: 80
End: 2020-11-19

## 2020-11-19 DIAGNOSIS — R91.1 SOLITARY PULMONARY NODULE: Primary | ICD-10-CM

## 2021-01-11 ENCOUNTER — TELEPHONE (OUTPATIENT)
Dept: SURGERY | Age: 81
End: 2021-01-11

## 2021-01-11 NOTE — TELEPHONE ENCOUNTER
Patient has appointment for yearly breast check on 7/13/21 and her yearly bilateral mammogram is scheduled for 3/11/21. Patient notified by phone.

## 2021-01-11 NOTE — TELEPHONE ENCOUNTER
Patient his scheduled to have a mammogram in march, she wants to know if it is going to be a bilateral mammogram.  Please call.

## 2021-03-07 ENCOUNTER — HOSPITAL ENCOUNTER (OUTPATIENT)
Dept: CT IMAGING | Age: 81
Discharge: HOME | End: 2021-03-07
Attending: INTERNAL MEDICINE
Payer: MEDICARE

## 2021-03-07 DIAGNOSIS — R91.1 SOLITARY PULMONARY NODULE: ICD-10-CM

## 2021-03-07 PROCEDURE — 71250 CT THORAX DX C-: CPT

## 2021-04-27 ENCOUNTER — TRANSCRIBE ORDER (OUTPATIENT)
Dept: SCHEDULING | Age: 81
End: 2021-04-27

## 2021-04-27 DIAGNOSIS — R91.1 SOLITARY PULMONARY NODULE: Primary | ICD-10-CM

## 2021-05-27 ENCOUNTER — HOSPITAL ENCOUNTER (OUTPATIENT)
Dept: MAMMOGRAPHY | Age: 81
Discharge: HOME | End: 2021-05-27
Attending: SURGERY
Payer: MEDICARE

## 2021-05-27 DIAGNOSIS — Z12.31 SCREENING MAMMOGRAM FOR HIGH-RISK PATIENT: ICD-10-CM

## 2021-05-27 PROCEDURE — 77063 BREAST TOMOSYNTHESIS BI: CPT

## 2021-10-14 ENCOUNTER — HOSPITAL ENCOUNTER (OUTPATIENT)
Dept: CT IMAGING | Age: 81
Discharge: HOME | End: 2021-10-14
Attending: INTERNAL MEDICINE
Payer: MEDICARE

## 2021-10-14 DIAGNOSIS — R91.1 SOLITARY PULMONARY NODULE: ICD-10-CM

## 2021-10-14 PROCEDURE — 71250 CT THORAX DX C-: CPT

## 2021-12-02 ENCOUNTER — TRANSCRIBE ORDER (OUTPATIENT)
Dept: SCHEDULING | Age: 81
End: 2021-12-02

## 2021-12-02 DIAGNOSIS — R91.1 SOLITARY PULMONARY NODULE: Primary | ICD-10-CM

## 2021-12-10 ENCOUNTER — OFFICE VISIT (OUTPATIENT)
Dept: SURGERY | Age: 81
End: 2021-12-10
Payer: MEDICARE

## 2021-12-10 VITALS
WEIGHT: 125 LBS | HEART RATE: 69 BPM | TEMPERATURE: 97.5 F | RESPIRATION RATE: 16 BRPM | BODY MASS INDEX: 23.6 KG/M2 | OXYGEN SATURATION: 99 % | HEIGHT: 61 IN

## 2021-12-10 DIAGNOSIS — Z12.31 SCREENING MAMMOGRAM FOR HIGH-RISK PATIENT: ICD-10-CM

## 2021-12-10 DIAGNOSIS — Z85.3 PERSONAL HISTORY OF MALIGNANT NEOPLASM OF BREAST: Primary | ICD-10-CM

## 2021-12-10 PROCEDURE — 99213 OFFICE O/P EST LOW 20 MIN: CPT | Performed by: SURGERY

## 2021-12-10 RX ORDER — NYSTATIN 100000 [USP'U]/G
POWDER TOPICAL 2 TIMES DAILY
Qty: 60 G | Refills: 3 | Status: SHIPPED | OUTPATIENT
Start: 2021-12-10 | End: 2022-06-08

## 2021-12-10 NOTE — PROGRESS NOTES
HISTORY OF PRESENT ILLNESS  Tasneem North is a 80 y.o. female who comes in for breast cancer follow up  Surgical Follow-up  Associated symptoms include headaches and shortness of breath. Pertinent negatives include no chest pain and no abdominal pain. Breast Cancer  Associated symptoms include headaches and shortness of breath. Pertinent negatives include no chest pain and no abdominal pain. Follow-up  Associated symptoms include headaches and shortness of breath. Pertinent negatives include no chest pain and no abdominal pain. She was diagnosed with a stage 1 ([id number]) left breast cancer on Coty 10, 2005. She had infiltrating lobular carcinoma which was ER pos 65%, AK negative and her2/thais negative. She was treated with Left lumpectomy and [place] lymph node biopsy, followed by whole breast radiation on B39/0413 clinical trial (Dr Jigar Banks) and Arimidex (Dr Grady Calderon). She developed pulmonary infiltrates ultimately requiring lung bx and this was determined to be due to the Arimidex. She was switched over to tamoxifen which she took for five years. She has ongoing breast pain but denies any new changes in her breasts, nipple changes or drainage, unexplained weight loss or bone pain. Breast MRI ([facility]) 10/5/2015 was negative. She had a bilateral mammogram 9/27/2018 which was BIRADS 1. She had menarche at 5, first of two pregnancies at 29, menopause in her late 45s and did not take HRT. Her sister was dx'd with breast cancer at 72 and her BRCA testing which was negative. She had a breast MRI at the [facility] 10/2016 and was noted to have a 5 mm enhancing suspicious lesion near the lumpectomy bed. MRI bx came back as fibrosis but Dr Owens felt the area was still worrisome and may have been discordant. She strongly recommended excision of the area. She underwent reexcision of the lumpectomy bed 11/2016 which was negative.    Bilateral screening 3D mammogram 5/27/2021 was BIRADS 2 after several 6 month follow ups.     Past Medical History:   Diagnosis Date    Anxiety     Arthritis     Breast CA ([facility] [address]     invasive breast lobular ca (radiation, arimidex, then switched tamoxifen)    Breast cancer ([facility] 10/2016    left breast lobular ca, [id number], lumpectomy, ALND, radiation, arimidex but switched to tamoxifen    Diarrhea     Dyspepsia and other specified disorders of function of stomach     GERD (gastroesophageal reflux disease)     Headache(784.0)     Hypertension     Migraine     Osteoporosis     Osteoporosis     Osteoporosis     Premature ventricular contractions (PVCs) (VPCs)     Thyroid disease     Visual loss      Past Surgical History:   Procedure Laterality Date    HX BREAST BIOPSY Left 2016    LEFT BREAST NEEDLE LOCALIZATION, EXCISIONAL BIOPSY (CHOICE ANESTH) performed by Beka Trimble MD at [facility]    HX BREAST LUMPECTOMY Left 2005    and ALND    HX BUNIONECTOMY Bilateral     no hardware    HX CATARACT REMOVAL Bilateral 2012    HX GYN      3 D&Cs    HX HYSTERECTOMY      HX LAURA AND BSO      ME CHEST SURGERY PROCEDURE UNLISTED      lung bx     Family History   Problem Relation Age of Onset    Stroke Mother     Heart Disease Mother     Hypertension Mother     Stroke Father     Heart Disease Father     Hypertension Father     Diabetes Brother     Heart Disease Brother     Hypertension Brother     Cancer Sister 76        breast ca    Breast Cancer Sister 76    Cancer Sister 58        endometriosis     Social History     Tobacco Use    Smoking status: Former Smoker     Quit date: 1978     Years since quittin.6    Smokeless tobacco: Never Used   Substance Use Topics    Alcohol use: Yes     Comment: rarely    Drug use: No     Current Outpatient Medications   Medication Sig    SYNTHROID 25 mcg tablet TAKE 1 TABLET ON AN EMPTY STOMACH IN THE MORNING    mometasone-formoterol (DULERA) 200-5 mcg/actuation HFA inhaler Take 2 Puffs by inhalation two (2) times daily as needed.  albuterol (PROAIR HFA) 90 mcg/actuation inhaler Take 2 Puffs by inhalation every four (4) hours as needed for Wheezing.  clonazePAM (KLONOPIN) 0.5 mg tablet Take  by mouth nightly as needed.  fluticasone (FLONASE) 50 mcg/actuation nasal spray nightly as needed.  valsartan (DIOVAN) 80 mg tablet Take 80 mg by mouth two (2) times a day.  atenolol (TENORMIN) 25 mg tablet Take 25 mg by mouth two (2) times a day.  hydrochlorothiazide (HYDRODIURIL) 25 mg tablet Take 25 mg by mouth daily.  pantoprazole (PROTONIX) 40 mg tablet Take 40 mg by mouth daily.  CALCIUM CARBONATE (CALCIUM 600 PO) Take  by mouth.  cholecalciferol, vitamin d3, (VITAMIN D) 1,000 unit tablet Take 4,000 Units by mouth daily.  THYROID,PORK (ARMOUR THYROID PO) Take 15 mcg by mouth. (Patient not taking: Reported on 12/10/2021)     No current facility-administered medications for this visit. Allergies   Allergen Reactions    Donnaphen [Phenobarb-Hyoscy-Atropine-Scop] Rash    Arimidex [Anastrozole] Other (comments)     Wandering pulmonary infiltrates    Levaquin [Levofloxacin] Other (comments)     \"set me wild\"      Voltaren [Diclofenac Sodium] Rash    Zithromax [Azithromycin] Hives and Nausea and Vomiting    Erythromycin Other (comments)     Abdominal pain       Review of Systems   Constitutional: Positive for malaise/fatigue and weight loss. Negative for chills, diaphoresis and fever. HENT: Positive for congestion. Negative for sore throat. Eyes: Positive for blurred vision. Negative for discharge. Respiratory: Positive for shortness of breath. Negative for cough ( occ) and wheezing. Cardiovascular: Negative for chest pain, palpitations, orthopnea, claudication and leg swelling. Gastrointestinal: Positive for diarrhea and heartburn. Negative for abdominal pain, constipation, melena, nausea and vomiting.         Bloating and indigestion  Recent colonoscopy suggesting microcytic colitis   Genitourinary: Negative for dysuria, flank pain, frequency and hematuria. Musculoskeletal: Positive for back pain (compression fractures), joint pain and myalgias. Negative for neck pain. Skin: Negative for rash. Neurological: Positive for headaches. Negative for dizziness, speech change, focal weakness, seizures, loss of consciousness and weakness. Endo/Heme/Allergies: Bruises/bleeds easily. Psychiatric/Behavioral: Negative for depression and memory loss. Visit Vitals  Pulse 69   Temp 97.5 °F (36.4 °C) (Temporal)   Resp 16   Ht 5' 1\" (1.549 m)   Wt 56.7 kg (125 lb)   SpO2 99%   BMI 23.62 kg/m²       Physical Exam  Exam conducted with a chaperone present. Constitutional:       General: She is not in acute distress. Appearance: She is well-developed. She is not diaphoretic. HENT:      Head: Normocephalic and atraumatic. Mouth/Throat:      Pharynx: No oropharyngeal exudate. Eyes:      General: No scleral icterus. Conjunctiva/sclera: Conjunctivae normal.      Pupils: Pupils are equal, round, and reactive to light. Neck:      Thyroid: No thyromegaly. Vascular: No JVD. Trachea: No tracheal deviation. Cardiovascular:      Rate and Rhythm: Normal rate and regular rhythm. Heart sounds: No murmur heard. No friction rub. No gallop. Pulmonary:      Effort: Pulmonary effort is normal. No respiratory distress. Breath sounds: No wheezing or rales. Chest:   Breasts:      Breasts are asymmetrical (slight disfigurement in the lower inner aspect of the left breast). Right: No inverted nipple, mass, nipple discharge, skin change, tenderness, axillary adenopathy or supraclavicular adenopathy. Left: Mass (5 cm seroma in cavity) and tenderness (lumpectomy site) present. No inverted nipple, nipple discharge, skin change, axillary adenopathy or supraclavicular adenopathy.          Abdominal:      General: Bowel sounds are normal. There is no distension. Palpations: Abdomen is soft. There is no mass. Tenderness: There is no abdominal tenderness. There is no guarding or rebound. Musculoskeletal:         General: Normal range of motion. Cervical back: Normal range of motion and neck supple. Lymphadenopathy:      Cervical: No cervical adenopathy. Upper Body:      Right upper body: No supraclavicular or axillary adenopathy. Left upper body: No supraclavicular or axillary adenopathy. Skin:     General: Skin is warm and dry. Coloration: Skin is not pale. Findings: No erythema or rash. Neurological:      Mental Status: She is alert and oriented to person, place, and time. Cranial Nerves: No cranial nerve deficit. Psychiatric:         Behavior: Behavior normal.         Thought Content: Thought content normal.         Judgment: Judgment normal.         ASSESSMENT and PLAN  1. Hx stage I breast ca: dx 2005 currently CHAR at 15 years 1 months:    Bilateral 3D screening mammogram 2022  She is in a moderate risk category (BRCA negative)  Not interested in MRI    Keep f/u with Eric Fernandez   2. Essential hypertension  3. Social hx.   spring 2018  4.  compression fractures in back. Still with pain  Followed by Dr Bharathi Cedeno  5. Lung nodules. Followed by Dr Chad Beckford  6. Mild weight loss 7 pounds in last year. States she is not eating as well since her  . Suggested boost/ensure daily if losing any more  7.   Received Moderna Covid vaccination 1-2021 and booster 10/2021    RTC 1 year      Jayant Payton MD FACS

## 2021-12-10 NOTE — PROGRESS NOTES
Identified pt with two pt identifiers(name and ). Reviewed record in preparation for visit and have obtained necessary documentation. All patient medications has been reviewed. Chief Complaint   Patient presents with    Follow-up     1 year breast, Personal history of malignant neoplasm of breast        Health Maintenance Due   Topic    COVID-19 Vaccine (1)    DTaP/Tdap/Td series (1 - Tdap)    Shingrix Vaccine Age 49> (1 of 2)    Bone Densitometry (Dexa) Screening     Pneumococcal 65+ years (1 of 1 - PPSV23)    Medicare Yearly Exam     Flu Vaccine (1)       Vitals:    12/10/21 1121   Pulse: 69   Resp: 16   Temp: 97.5 °F (36.4 °C)   TempSrc: Temporal   SpO2: 99%   Weight: 56.7 kg (125 lb)   Height: 5' 1\" (1.549 m)   PainSc:   0 - No pain       4. Have you been to the ER, urgent care clinic since your last visit? Hospitalized since your last visit? No    5. Have you seen or consulted any other health care providers outside of the [address] [place] [address] since your last visit? Include any pap smears or colon screening. No      Patient is accompanied by self I have received verbal consent from Morgan Luna to discuss any/all medical information while they are present in the room. Patient refused blood pressure toady.

## 2022-02-23 ENCOUNTER — HOSPITAL ENCOUNTER (OUTPATIENT)
Dept: CT IMAGING | Age: 82
Discharge: HOME | End: 2022-02-23
Attending: INTERNAL MEDICINE
Payer: MEDICARE

## 2022-02-23 DIAGNOSIS — R91.1 SOLITARY PULMONARY NODULE: ICD-10-CM

## 2022-02-23 PROCEDURE — 71250 CT THORAX DX C-: CPT

## 2022-03-19 PROBLEM — N64.89 SEROMA OF BREAST: Status: ACTIVE | Noted: 2017-03-20

## 2022-10-04 ENCOUNTER — TRANSCRIBE ORDER (OUTPATIENT)
Dept: SCHEDULING | Age: 82
End: 2022-10-04

## 2022-10-04 DIAGNOSIS — Z12.31 SCREENING MAMMOGRAM FOR HIGH-RISK PATIENT: Primary | ICD-10-CM

## 2022-10-07 ENCOUNTER — HOSPITAL ENCOUNTER (OUTPATIENT)
Dept: MAMMOGRAPHY | Age: 82
Discharge: HOME | End: 2022-10-07
Attending: SURGERY
Payer: MEDICARE

## 2022-10-07 DIAGNOSIS — Z12.31 SCREENING MAMMOGRAM FOR HIGH-RISK PATIENT: ICD-10-CM

## 2022-10-07 PROCEDURE — 77063 BREAST TOMOSYNTHESIS BI: CPT

## 2023-03-22 ENCOUNTER — OFFICE VISIT (OUTPATIENT)
Dept: SURGERY | Age: 83
End: 2023-03-22
Payer: MEDICARE

## 2023-03-22 VITALS — HEIGHT: 61 IN | WEIGHT: 124.4 LBS | BODY MASS INDEX: 23.49 KG/M2

## 2023-03-22 DIAGNOSIS — Z12.31 SCREENING MAMMOGRAM FOR HIGH-RISK PATIENT: ICD-10-CM

## 2023-03-22 DIAGNOSIS — Z85.3 PERSONAL HISTORY OF MALIGNANT NEOPLASM OF BREAST: Primary | ICD-10-CM

## 2023-03-22 PROCEDURE — 1123F ACP DISCUSS/DSCN MKR DOCD: CPT | Performed by: SURGERY

## 2023-03-22 PROCEDURE — G8420 CALC BMI NORM PARAMETERS: HCPCS | Performed by: SURGERY

## 2023-03-22 PROCEDURE — 1101F PT FALLS ASSESS-DOCD LE1/YR: CPT | Performed by: SURGERY

## 2023-03-22 PROCEDURE — 1090F PRES/ABSN URINE INCON ASSESS: CPT | Performed by: SURGERY

## 2023-03-22 PROCEDURE — G8536 NO DOC ELDER MAL SCRN: HCPCS | Performed by: SURGERY

## 2023-03-22 PROCEDURE — G8510 SCR DEP NEG, NO PLAN REQD: HCPCS | Performed by: SURGERY

## 2023-03-22 PROCEDURE — 99213 OFFICE O/P EST LOW 20 MIN: CPT | Performed by: SURGERY

## 2023-03-22 PROCEDURE — G8400 PT W/DXA NO RESULTS DOC: HCPCS | Performed by: SURGERY

## 2023-03-22 PROCEDURE — G8427 DOCREV CUR MEDS BY ELIG CLIN: HCPCS | Performed by: SURGERY

## 2023-03-22 RX ORDER — MULTIVIT-MINERALS/FOLIC ACID 120 MCG
TABLET,CHEWABLE ORAL
COMMUNITY

## 2023-03-22 RX ORDER — BUDESONIDE AND FORMOTEROL FUMARATE DIHYDRATE 80; 4.5 UG/1; UG/1
2 AEROSOL RESPIRATORY (INHALATION)
COMMUNITY

## 2023-03-22 NOTE — PROGRESS NOTES
HISTORY OF PRESENT ILLNESS  Ivett Lawson is a 80 y.o. female who comes in for breast cancer follow up  Follow-up  Associated symptoms include headaches and shortness of breath. Pertinent negatives include no chest pain and no abdominal pain. Surgical Follow-up  Associated symptoms include headaches and shortness of breath. Pertinent negatives include no chest pain and no abdominal pain. Breast Cancer  Associated symptoms include headaches and shortness of breath. Pertinent negatives include no chest pain and no abdominal pain. She was diagnosed with a stage 1 ([id number]) left breast cancer on Coty 10, 2005. She had infiltrating lobular carcinoma which was ER pos 65%, MD negative and her2/thais negative. She was treated with Left lumpectomy and [place] lymph node biopsy, followed by whole breast radiation on B39/0413 clinical trial (Dr Emil Shell) and Arimidex (Dr Penny Johnson). She developed pulmonary infiltrates ultimately requiring lung bx and this was determined to be due to the Arimidex. She was switched over to tamoxifen which she took for five years. She has ongoing breast pain but denies any new changes in her breasts, nipple changes or drainage, unexplained weight loss or bone pain. Breast MRI ([facility]) 10/5/2015 was negative. She had a bilateral mammogram 9/27/2018 which was BIRADS 1. She had menarche at 5, first of two pregnancies at 29, menopause in her late 45s and did not take HRT. Her sister was dx'd with breast cancer at 72 and her BRCA testing which was negative. She had a breast MRI at the [facility] 10/2016 and was noted to have a 5 mm enhancing suspicious lesion near the lumpectomy bed. MRI bx came back as fibrosis but Dr Owens felt the area was still worrisome and may have been discordant. She strongly recommended excision of the area. She underwent reexcision of the lumpectomy bed 11/2016 which was negative. Bilateral screening 3D mammogram 10/7/2022 was BIRADS 2.     Past Medical History:   Diagnosis Date    Anxiety     Arthritis     Breast CA ([facility] [address]     invasive breast lobular ca (radiation, arimidex, then switched tamoxifen)    Breast cancer ([email or web address] 10/2016    left breast lobular ca, [id number], lumpectomy, ALND, radiation, arimidex but switched to tamoxifen    Diarrhea     Dyspepsia and other specified disorders of function of stomach     GERD (gastroesophageal reflux disease)     Headache(784.0)     Hypertension     Menopause     Migraine     Osteoporosis     Osteoporosis     Osteoporosis     Premature ventricular contractions (PVCs) (VPCs)     Thyroid disease     Visual loss      Past Surgical History:   Procedure Laterality Date    HX BREAST BIOPSY Left 2016    LEFT BREAST NEEDLE LOCALIZATION, EXCISIONAL BIOPSY (CHOICE ANESTH) performed by Vera Bagley MD at [facility]    HX BREAST LUMPECTOMY Left 2005    and ALND    HX BUNIONECTOMY Bilateral     no hardware    HX CATARACT REMOVAL Bilateral 2012    HX GYN      3 D&Cs    HX HYSTERECTOMY      HX LAURA AND BSO      [place] UNLISTED PROCEDURE LUNGS & PLEURA  2005    lung bx     Family History   Problem Relation Age of Onset    Stroke Mother     Heart Disease Mother     Hypertension Mother     Stroke Father     Heart Disease Father     Hypertension Father     Diabetes Brother     Heart Disease Brother     Hypertension Brother     Cancer Sister 76        breast ca    Breast Cancer Sister 76    Cancer Sister 58        endometriosis     Social History     Tobacco Use    Smoking status: Former     Types: Cigarettes     Quit date: 1978     Years since quittin.9    Smokeless tobacco: Never   Vaping Use    Vaping Use: Never used   Substance Use Topics    Alcohol use: Yes     Comment: rarely    Drug use: No     Current Outpatient Medications   Medication Sig    budesonide-formoteroL (Symbicort) 80-4.5 mcg/actuation HFAA Take 2 Puffs by inhalation.     multivit with min-folic acid (Centrum Adult 50 Fresh-Fruity) 120 mcg chew Take  by mouth. SYNTHROID 25 mcg tablet TAKE 1 TABLET ON AN EMPTY STOMACH IN THE MORNING    mometasone-formoterol (DULERA) 200-5 mcg/actuation HFA inhaler Take 2 Puffs by inhalation two (2) times daily as needed. albuterol (PROVENTIL HFA, VENTOLIN HFA, PROAIR HFA) 90 mcg/actuation inhaler Take 2 Puffs by inhalation every four (4) hours as needed for Wheezing. clonazePAM (KLONOPIN) 0.5 mg tablet Take  by mouth nightly as needed. fluticasone (FLONASE) 50 mcg/actuation nasal spray nightly as needed. valsartan (DIOVAN) 80 mg tablet Take 80 mg by mouth two (2) times a day. atenolol (TENORMIN) 25 mg tablet Take 25 mg by mouth two (2) times a day. hydrochlorothiazide (HYDRODIURIL) 25 mg tablet Take 25 mg by mouth daily. pantoprazole (PROTONIX) 40 mg tablet Take 40 mg by mouth daily. CALCIUM CARBONATE (CALCIUM 600 PO) Take  by mouth. cholecalciferol (VITAMIN D3) (1000 Units /25 mcg) tablet Take 4,000 Units by mouth daily. THYROID,PORK (ARMOUR THYROID PO) Take 15 mcg by mouth. (Patient not taking: No sig reported)     No current facility-administered medications for this visit. Allergies   Allergen Reactions    Donnaphen [Phenobarb-Hyoscy-Atropine-Scop] Rash    Arimidex [Anastrozole] Other (comments)     Wandering pulmonary infiltrates    Levaquin [Levofloxacin] Other (comments)     \"set me wild\"      Voltaren [Diclofenac Sodium] Rash    Zithromax [Azithromycin] Hives and Nausea and Vomiting    Diclofenac Rash    Erythromycin Other (comments)     Abdominal pain       Review of Systems   Constitutional:  Positive for malaise/fatigue and weight loss. Negative for chills, diaphoresis and fever. HENT:  Positive for congestion. Negative for sore throat. Eyes:  Positive for blurred vision. Negative for discharge. Respiratory:  Positive for shortness of breath. Negative for cough ( occ) and wheezing.     Cardiovascular:  Negative for chest pain, palpitations, orthopnea, claudication and leg swelling. Gastrointestinal:  Positive for diarrhea and heartburn. Negative for abdominal pain, constipation, melena, nausea and vomiting. Bloating and indigestion  Recent colonoscopy suggesting microcytic colitis   Genitourinary:  Negative for dysuria, flank pain, frequency and hematuria. Musculoskeletal:  Positive for back pain (compression fractures), joint pain and myalgias. Negative for neck pain. Skin:  Negative for rash. Neurological:  Positive for headaches. Negative for dizziness, speech change, focal weakness, seizures, loss of consciousness and weakness. Endo/Heme/Allergies:  Bruises/bleeds easily. Psychiatric/Behavioral:  Negative for depression and memory loss. Visit Vitals  Ht 5' 1\" (1.549 m)   Wt 56.4 kg (124 lb 6.4 oz)   BMI 23.51 kg/m²       Physical Exam  Exam conducted with a chaperone present. Constitutional:       General: She is not in acute distress. Appearance: She is well-developed. She is not diaphoretic. HENT:      Head: Normocephalic and atraumatic. Mouth/Throat:      Pharynx: No oropharyngeal exudate. Eyes:      General: No scleral icterus. Conjunctiva/sclera: Conjunctivae normal.      Pupils: Pupils are equal, round, and reactive to light. Neck:      Thyroid: No thyromegaly. Vascular: No JVD. Trachea: No tracheal deviation. Cardiovascular:      Rate and Rhythm: Normal rate and regular rhythm. Heart sounds: No murmur heard. No friction rub. No gallop. Pulmonary:      Effort: Pulmonary effort is normal. No respiratory distress. Breath sounds: No wheezing or rales. Chest:   Breasts:     Breasts are asymmetrical (slight disfigurement in the lower inner aspect of the left breast). Right: No inverted nipple, mass, nipple discharge, skin change or tenderness. Left: Mass (5 cm seroma in cavity) and tenderness (lumpectomy site) present.  No inverted nipple, nipple discharge or skin change. Abdominal:      General: Bowel sounds are normal. There is no distension. Palpations: Abdomen is soft. There is no mass. Tenderness: There is no abdominal tenderness. There is no guarding or rebound. Musculoskeletal:         General: Normal range of motion. Cervical back: Normal range of motion and neck supple. Lymphadenopathy:      Cervical: No cervical adenopathy. Upper Body:      Right upper body: No supraclavicular or axillary adenopathy. Left upper body: No supraclavicular or axillary adenopathy. Skin:     General: Skin is warm and dry. Coloration: Skin is not pale. Findings: No erythema or rash. Neurological:      Mental Status: She is alert and oriented to person, place, and time. Cranial Nerves: No cranial nerve deficit. Psychiatric:         Behavior: Behavior normal.         Thought Content: Thought content normal.         Judgment: Judgment normal.       ASSESSMENT and PLAN  1. Hx stage I breast ca: dx 2005 currently CHAR at 17 years 9 months:    Bilateral 3D screening mammogram 2022  She is in a moderate risk category (BRCA negative)  Not interested in MRI    Keep f/u with Eric Edmondson   2. Essential hypertension  3. Social hx.   spring 2018  4.  compression fractures in back. Still with pain  Followed by Dr Qasim Hoskins  5. Lung nodules. Followed by Dr Delicia Plummer  6. Mild weight loss 7 pounds in last year. States she is not eating as well since her  . Suggested boost/ensure daily if losing any more  7. Received Moderna Covid vaccination 1-2021 and booster 10/2021 and .   She then got covid in fall     Patient refused BP checks  RTC 1 year      Juan Infante MD FACS

## 2023-03-22 NOTE — PROGRESS NOTES
Identified pt with two pt identifiers(name and ). Reviewed record in preparation for visit and have obtained necessary documentation. All patient medications has been reviewed. Chief Complaint   Patient presents with    Follow-up     Follow up from , personal history of malignant neoplasm of breast.           Health Maintenance Due   Topic    DTaP/Tdap/Td series (1 - Tdap)    Shingles Vaccine (1 of 2)    Bone Densitometry (Dexa) Screening     Pneumococcal 65+ years (1 - PCV)    Medicare Yearly Exam     COVID-19 Vaccine (4 - Booster for [email or web address] series)    Flu Vaccine (1)    Depression Screen        Patient refused vitals. Vitals:    23 1304   Weight: 124 lb 6.4 oz (56.4 kg)   Height: 5' 1\" (1.549 m)       4. Have you been to the ER, urgent care clinic since your last visit? Hospitalized since your last visit? No    5. Have you seen or consulted any other health care providers outside of the [address] [place] [address] since your last visit? Include any pap smears or colon screening. No      Patient is accompanied by self I have received verbal consent from Valeria Morin to discuss any/all medical information while they are present in the room.

## 2023-04-21 DIAGNOSIS — Z12.31 SCREENING MAMMOGRAM FOR HIGH-RISK PATIENT: Primary | ICD-10-CM

## 2023-04-23 DIAGNOSIS — Z12.31 SCREENING MAMMOGRAM FOR HIGH-RISK PATIENT: Primary | ICD-10-CM

## 2024-04-23 ENCOUNTER — TELEPHONE (OUTPATIENT)
Age: 84
End: 2024-04-23

## 2024-04-23 NOTE — TELEPHONE ENCOUNTER
Pt wants to know if she still needs to come to her appts since she has been cancer free for several years. She said her health is declining and its hard for her to get to places. [phone]

## 2024-04-23 NOTE — TELEPHONE ENCOUNTER
FYI Patient called back and said disregard last message will c/b in future if needing a fu will not schedule at this time